# Patient Record
Sex: MALE | Race: WHITE | NOT HISPANIC OR LATINO | Employment: FULL TIME | URBAN - METROPOLITAN AREA
[De-identification: names, ages, dates, MRNs, and addresses within clinical notes are randomized per-mention and may not be internally consistent; named-entity substitution may affect disease eponyms.]

---

## 2017-01-17 DIAGNOSIS — I10 ESSENTIAL HYPERTENSION, BENIGN: ICD-10-CM

## 2017-01-17 DIAGNOSIS — Z95.2 H/O AORTIC VALVE REPLACEMENT: ICD-10-CM

## 2017-01-17 RX ORDER — AMLODIPINE AND BENAZEPRIL HYDROCHLORIDE 5; 40 MG/1; MG/1
1 CAPSULE ORAL DAILY
Qty: 90 CAP | Refills: 3 | Status: SHIPPED | OUTPATIENT
Start: 2017-01-17 | End: 2017-12-23 | Stop reason: SDUPTHER

## 2017-01-26 ENCOUNTER — APPOINTMENT (OUTPATIENT)
Dept: LAB | Facility: MEDICAL CENTER | Age: 62
End: 2017-01-26
Payer: COMMERCIAL

## 2017-01-26 ENCOUNTER — HOSPITAL ENCOUNTER (OUTPATIENT)
Dept: LAB | Facility: MEDICAL CENTER | Age: 62
End: 2017-01-26
Attending: NURSE PRACTITIONER
Payer: COMMERCIAL

## 2017-01-26 LAB
INR PPP: 2.51 (ref 0.87–1.13)
PROTHROMBIN TIME: 27.9 SEC (ref 12–14.6)

## 2017-01-26 PROCEDURE — 85610 PROTHROMBIN TIME: CPT

## 2017-01-26 PROCEDURE — 36415 COLL VENOUS BLD VENIPUNCTURE: CPT

## 2017-01-27 ENCOUNTER — ANTICOAGULATION MONITORING (OUTPATIENT)
Dept: VASCULAR LAB | Facility: MEDICAL CENTER | Age: 62
End: 2017-01-27

## 2017-01-27 DIAGNOSIS — Z95.2 H/O AORTIC VALVE REPLACEMENT: ICD-10-CM

## 2017-01-27 DIAGNOSIS — Z79.01 CHRONIC ANTICOAGULATION: ICD-10-CM

## 2017-01-27 NOTE — PROGRESS NOTES
Anticoagulation Summary as of 1/27/2017     INR goal 2.0-3.0   Selected INR 2.51 (1/26/2017)   Maintenance plan 7.5 mg (5 mg x 1.5) on Mon, Wed, Fri; 5 mg (5 mg x 1) all other days   Weekly total 42.5 mg   No change documented Mary Beth Flower, Med Ass't   Plan last modified Gianni Rodgers, PHARMD (9/12/2016)   Next INR check 2/9/2017   Target end date Indefinite    Indications   Chronic anticoagulation [Z79.01]  H/O aortic valve replacement [Z95.2]         Anticoagulation Episode Summary     INR check location     Preferred lab     Send INR reminders to     Comments       Anticoagulation Care Providers     Provider Role Specialty Phone number    Sheila Neville M.D. Referring Cardiology [phone]    CIARA Borja.P.N. Responsible Cardiology [phone]    MELVINA Patton Responsible Cardiology [phone]        Anticoagulation Patient Findings   Negatives Missed Doses, Extra Doses, Medication Changes, Antibiotic Use, Diet Changes, Dental/Other Procedures, Hospitalization, Bleeding Gums, Nose Bleeds, Blood in Urine, Blood in Stool, Any Bruising, Other Complaints        Spoke with patient to report a therapeutic INR.  Pt instructed to continue with current warfarin dosing regimen. Pt denies any s/s of bleeding, bruising, clotting or any changes to diet or medication.  Will follow up in 2 weeks.    Mary Beth Flower, Med Ass't    Agree with above plan.    MITCHEL Gonzalez

## 2017-02-05 DIAGNOSIS — I10 ESSENTIAL HYPERTENSION, BENIGN: ICD-10-CM

## 2017-02-07 RX ORDER — METOPROLOL SUCCINATE 100 MG/1
TABLET, EXTENDED RELEASE ORAL
Qty: 90 TAB | Refills: 1 | Status: SHIPPED | OUTPATIENT
Start: 2017-02-07 | End: 2018-02-09 | Stop reason: SDUPTHER

## 2017-02-10 ENCOUNTER — OFFICE VISIT (OUTPATIENT)
Dept: RHEUMATOLOGY | Facility: PHYSICIAN GROUP | Age: 62
End: 2017-02-10
Payer: COMMERCIAL

## 2017-02-10 VITALS
BODY MASS INDEX: 33.42 KG/M2 | WEIGHT: 243 LBS | RESPIRATION RATE: 14 BRPM | TEMPERATURE: 98.1 F | OXYGEN SATURATION: 95 % | HEART RATE: 73 BPM | SYSTOLIC BLOOD PRESSURE: 148 MMHG | DIASTOLIC BLOOD PRESSURE: 92 MMHG

## 2017-02-10 DIAGNOSIS — M1A.09X0 IDIOPATHIC CHRONIC GOUT OF MULTIPLE SITES WITHOUT TOPHUS: ICD-10-CM

## 2017-02-10 DIAGNOSIS — E66.01 MORBID OBESITY DUE TO EXCESS CALORIES (HCC): ICD-10-CM

## 2017-02-10 DIAGNOSIS — Z79.01 CHRONIC ANTICOAGULATION: ICD-10-CM

## 2017-02-10 DIAGNOSIS — Z95.2 H/O AORTIC VALVE REPLACEMENT: ICD-10-CM

## 2017-02-10 PROCEDURE — 99214 OFFICE O/P EST MOD 30 MIN: CPT | Performed by: INTERNAL MEDICINE

## 2017-02-10 RX ORDER — ALLOPURINOL 300 MG/1
300 TABLET ORAL
Qty: 90 TAB | Refills: 1 | Status: SHIPPED | OUTPATIENT
Start: 2017-02-10 | End: 2018-02-01 | Stop reason: SDUPTHER

## 2017-02-10 RX ORDER — COLCHICINE 0.6 MG/1
0.6 TABLET ORAL 2 TIMES DAILY
Qty: 180 TAB | Refills: 3 | Status: SHIPPED | OUTPATIENT
Start: 2017-02-10 | End: 2018-02-12

## 2017-02-10 NOTE — MR AVS SNAPSHOT
Félix Elam   2/10/2017 11:15 AM   Office Visit   MRN: [id number]    Department:  Rheumatology Med [facility]   Dept Phone:  [phone]    Description:  Male : 1955   Provider:  Kira Ruggiero M.D.           Reason for Visit     Follow-Up           Allergies as of 2/10/2017     No Known Allergies      You were diagnosed with     Idiopathic chronic gout of multiple sites without tophus   [441017]       Chronic anticoagulation   [594197]       H/O aortic valve replacement   [662678]       Morbid obesity due to excess calories (CMS-HCC)   [3259879]         Vital Signs     Blood Pressure Pulse Temperature Respirations Weight Oxygen Saturation    148/92 mmHg 73 36.7 °C (98.1 °F) 14 110.224 kg (243 lb) 95%    Smoking Status                   Never Smoker            Basic Information     Date Of Birth Sex Race Ethnicity Preferred Language    1955 Male White Non- English      Your appointments     2018  8:00 AM   Follow Up Visit with Kira Ruggiero M.D.   [facility] (--)    [address] [place] [id number] [phone]           You will be receiving a confirmation call a few days before your appointment from our automated call confirmation system.              Problem List              ICD-10-CM Priority Class Noted - Resolved    Chronic gout M1A.9XX0   2015 - Present    Chronic anticoagulation Z79.01   2015 - Present    H/O aortic valve replacement Z95.2   2015 - Present    Thrombocytopenia (CMS-HCC) D69.6   8/10/2016 - Present      Health Maintenance        Date Due Completion Dates    IMM DTaP/Tdap/Td Vaccine (1 - Tdap) 10/12/1974 ---    COLONOSCOPY 10/12/2005 ---    IMM ZOSTER VACCINE 10/12/2015 ---    IMM INFLUENZA (1) 2016 ---            Current Immunizations     No immunizations on file.      Below and/or attached are the medications your provider expects you to take. Review all of your home medications and newly  ordered medications with your provider and/or pharmacist. Follow medication instructions as directed by your provider and/or pharmacist. Please keep your medication list with you and share with your provider. Update the information when medications are discontinued, doses are changed, or new medications (including over-the-counter products) are added; and carry medication information at all times in the event of emergency situations     Allergies:  No Known Allergies          Medications  Valid as of: February 10, 2017 - 11:26 AM    Generic Name Brand Name Tablet Size Instructions for use    Allopurinol (Tab) ZYLOPRIM 300 MG Take 1 Tab by mouth every day.        Amlodipine Besy-Benazepril HCl (Cap) LOTREL 5-40 MG Take 1 Cap by mouth every day.        Aspirin (Tablet Delayed Response) ECOTRIN 81 MG Take 1 Tab by mouth every day.        Coenzyme Q10   Take  by mouth.        Colchicine (Tab) COLCRYS 0.6 MG Take 1 Tab by mouth 2 times a day.        HydroCHLOROthiazide (Tab) HYDRODIURIL 12.5 MG Take 1 Tab by mouth every day.        MD ALERT... warfarin (COUMADIN) COUMADIN  by Other route as needed.        Metoprolol Succinate (TABLET SR 24 HR) TOPROL  MG TAKE ONE TABLET BY MOUTH EVERY DAY        Rosuvastatin Calcium (Tab) CRESTOR 5 MG Take 1 Tab by mouth every evening.        Vitamins-Lipotropics (Tab) B-100 COMPLEX  Take 1 Tab by mouth every day.        Warfarin Sodium (Tab) COUMADIN 5 MG Take 1-1.5 tabs by mouth daily or as directed by coumadin clinic        .                 Medicines prescribed today were sent to:     [id number]  [place] - [address] AT [facility] [address]    [address] [place] [address]    Phone: [phone] Fax: [phone]    Open 24 Hours?: No      Medication refill instructions:       If your prescription bottle indicates you have medication refills left, it is not necessary to call your provider’s office. Please contact your pharmacy and  they will refill your medication.    If your prescription bottle indicates you do not have any refills left, you may request refills at any time through one of the following ways: The online [employer] system (except Urgent Care), by calling your provider’s office, or by asking your pharmacy to contact your provider’s office with a refill request. Medication refills are processed only during regular business hours and may not be available until the next business day. Your provider may request additional information or to have a follow-up visit with you prior to refilling your medication.   *Please Note: Medication refills are assigned a new Rx number when refilled electronically. Your pharmacy may indicate that no refills were authorized even though a new prescription for the same medication is available at the pharmacy. Please request the medicine by name with the pharmacy before contacting your provider for a refill.        Your To Do List     Future Labs/Procedures Complete By Expires    CBC WITH DIFFERENTIAL  As directed 2/10/2018    COMP METABOLIC PANEL  As directed 2/10/2018    URIC ACID  As directed 2/10/2018         [employer] Access Code: Activation code not generated  Current [employer] Status: Active

## 2017-02-10 NOTE — Clinical Note
[facility]   [address], [address]  [place] [address]  Phone: [phone]  Fax: [phone]              Encounter Date: 2/10/2017    Dear Dr. Diaz ref. provider found,    It was a pleasure seeing your patient, Félix Elam, on 2/10/2017. Diagnoses of Idiopathic chronic gout of multiple sites without tophus, Chronic anticoagulation, H/O aortic valve replacement, and Morbid obesity due to excess calories (CMS-[facility]) were pertinent to this visit.     Please find attached progress note which includes the history I obtained from Mr. Elam, my physical examination findings, my impression and recommendations.      Once again, it was a pleasure participating in your patient's care.  Please feel free to contact me if you have any questions or if I can be of any further assistance to your patients.      Sincerely,    Kira Ruggiero M.D.  Electronically Signed          PROGRESS NOTE:  No notes on file

## 2017-02-10 NOTE — PROGRESS NOTES
Chief Complaint- joint pain    Subjective:   Félix Elam is a 61 y.o. male here today for follow up of rheumatological issues    This is a follow-up visit for this patient with gout, last seen July 2016, Dx with gout 2008 in [place] with right great toe swelling, treated at the time symptomatically, with repeat of gout flare 6 months later, then gout progressed to toe, ankle and knee all on right side   Started on allopurinol 300 mg po qday since about 2010 and colchicine 0.6 mg po bid since 2010, pt tried to cut out the colchicine but had a gout  Flare and it was felt that patient should continue colchicine indefinitely  Since the last visit, patient states he's had no gout flares, patient is not exactly perfect with monitoring his diet, still has beer and shellfish occasionally,  Patient also with a history of aortic valve stenosis with resultant mechanical valve replacement, on chronic anticoagulation for such  Patient denies any chronic skin conditions, no history of psoriasis or eczema, denies any chronic anemia issues, denies any colitis, denies any diabetes mellitus, denies any thyroid problems, no iritis or uveitis in the past,  Patient continues to try to be cognizant of his walking and uses a fit bit to monitor his physical activity.    Uric acid 4.5 11/2015; Uric acid 4.1 9/2016    Current medicines (including changes today)  Current Outpatient Prescriptions   Medication Sig Dispense Refill   • allopurinol (ZYLOPRIM) 300 MG Tab Take 1 Tab by mouth every day. 90 Tab 1   • colchicine (COLCRYS) 0.6 MG Tab Take 1 Tab by mouth 2 times a day. 180 Tab 3   • metoprolol SR (TOPROL XL) 100 MG TABLET SR 24 HR TAKE ONE TABLET BY MOUTH EVERY DAY 90 Tab 1   • amlodipine-benazepril (LOTREL) 5-40 MG per capsule Take 1 Cap by mouth every day. 90 Cap 3   • rosuvastatin (CRESTOR) 5 MG Tab Take 1 Tab by mouth every evening. 90 Tab 3   • warfarin (COUMADIN) 5 MG Tab Take 1-1.5 tabs by mouth daily or as directed by  coumadin clinic 100 Tab 1   • hydrochlorothiazide (HYDRODIURIL) 12.5 MG tablet Take 1 Tab by mouth every day. 90 Tab 3   • aspirin EC (ECOTRIN) 81 MG Tablet Delayed Response Take 1 Tab by mouth every day. 90 Tab 3   • MD ALERT... warfarin (COUMADIN) by Other route as needed.     • Coenzyme Q10 (CO Q 10 PO) Take  by mouth.     • Vitamins-Lipotropics (B-100 COMPLEX) Tab Take 1 Tab by mouth every day. 90 Tab 3     No current facility-administered medications for this visit.     He  has a past medical history of Hypertension and Hyperlipidemia.    ROS   Other than what is mentioned in HPI or physical exam, there is no history of headaches, double vision or blurred vision. No temporal tenderness or jaw claudication. No history of cataracts or glaucoma. No trouble swallowing difficulties or sore throats. No history of thyroid disease. No chest complaints including chest pain, cough or sputum production. No shortness of breath. No GI complaints including nausea, vomiting, change in bowel habits, or past peptic ulcer disease. No history of blood in the stools. No urinary complaints including dysuria or frequency. No history of rash including psoriasis. No history of alopecia, photosensitivity, oral ulcerations, Raynaud's phenomena, or swollen joints. No history of gout. No back complaints. No history of low blood counts.       Objective:     Blood pressure 148/92, pulse 73, temperature 36.7 °C (98.1 °F), resp. rate 14, weight 110.224 kg (243 lb), SpO2 95 %. Body mass index is 33.42 kg/(m^2).   Physical Exam:  Constitutional: Alert and oriented X3, no distress.Skin: Warm, dry, good turgor, no rashes in visible areas Did not appreciate any psoriatic plaques or discoid lesionsEye: Equal, round and reactive, conjunctiva clear, lids normal EOM intactENMT: Lips without lesions, good dentition, no oropharyngeal ulcers, moist buccal mucosa, pinna without deformityNeck: Trachea midline, no masses, no thyromegaly.Lymph: no cervical  lymphadenopathy, no axillary lymphadenopathy, no supraclavicular lymphadenopathyRespiratory: Unlabored respiratory effort, lungs clear to auscultation, no wheezes, no ronchi.Cardiovascular: Normal S1, S2,There is a loud click compatible with patient's artificial aortic valve, also with a 3/6 systolic ejection murmur heard at the right sternal border, no edema.Abdomen: Soft, non-tender, no masses, no hepatosplenomegaly positive central obesityPsych: Alert and oriented x3, normal affect and mood.Neuro: Cranial nerves 2-12 are grossly intact, no loss of sensation LEExt:no joint laxity noted in bilateral arms, no joint laxity noted in bilateral legs, no nodule, no tophi, no ulnar deviation, no flexure contractures, no swan-neck or but deformities, no sausage digits, evidence of Heberden nodes, no tophi, no nodules, there is about 2+ pitting edema bilateral lower extremities, patient just recently got off an airplane after a long trip, patient states the swelling in legs is pretty typical of his long trips.  Lab Results   Component Value Date/Time    HEPATITIS C ANTIBODY Negative 09/19/2016 02:25 PM     Lab Results   Component Value Date/Time    SODIUM 145 09/19/2016 02:25 PM    POTASSIUM 3.6 09/19/2016 02:25 PM    CHLORIDE 108 09/19/2016 02:25 PM    CO2 27 09/19/2016 02:25 PM    GLUCOSE 93 09/19/2016 02:25 PM    BUN 25* 09/19/2016 02:25 PM    CREATININE 0.74 09/19/2016 02:25 PM      Lab Results   Component Value Date/Time    WBC 3.8* 08/10/2016 09:01 AM    RBC 4.80 08/10/2016 09:01 AM    HEMOGLOBIN 14.9 08/10/2016 09:01 AM    HEMATOCRIT 45.2 08/10/2016 09:01 AM    MCV 94.2 08/10/2016 09:01 AM    MCH 31.0 08/10/2016 09:01 AM    MCHC 33.0* 08/10/2016 09:01 AM    MPV 12.8 08/10/2016 09:01 AM    NEUTROPHILS-POLYS 59.90 08/10/2016 09:01 AM    LYMPHOCYTES 28.20 08/10/2016 09:01 AM    MONOCYTES 8.40 08/10/2016 09:01 AM    EOSINOPHILS 2.40 08/10/2016 09:01 AM    BASOPHILS 1.10 08/10/2016 09:01 AM      Lab Results   Component  Value Date/Time    CALCIUM 9.2 09/19/2016 02:25 PM    AST(SGOT) 29 09/19/2016 02:25 PM    ALT(SGPT) 26 09/19/2016 02:25 PM    ALKALINE PHOSPHATASE 51 09/19/2016 02:25 PM    TOTAL BILIRUBIN 1.0 09/19/2016 02:25 PM    ALBUMIN 4.7 09/19/2016 02:25 PM    TOTAL PROTEIN 6.6 09/19/2016 02:25 PM     Lab Results   Component Value Date/Time    URIC ACID 4.1 09/19/2016 02:25 PM    ANTINUCLEAR ANTIBODY None Detected 09/19/2016 02:25 PM       Assessment and Plan:     1. Idiopathic chronic gout of multiple sites without tophus  Continue allopurinol 300 mg by mouth daily and colchicine 0.6 mg by mouth twice a day, today we'll recheck a uric acid level as well as blood count and liver functions and kidney functions  - URIC ACID; Future  - CBC WITH DIFFERENTIAL; Future  - COMP METABOLIC PANEL; Future  - allopurinol (ZYLOPRIM) 300 MG Tab; Take 1 Tab by mouth every day.  Dispense: 90 Tab; Refill: 1  - colchicine (COLCRYS) 0.6 MG Tab; Take 1 Tab by mouth 2 times a day.  Dispense: 180 Tab; Refill: 3    2. Chronic anticoagulation  Because of aortic valve replacement  This will impact with kind of medications we can use for this patient's arthritis, NSAIDs are contraindicated because of increased risk of bleeding while on chronic anticoagulation  - URIC ACID; Future  - CBC WITH DIFFERENTIAL; Future  - COMP METABOLIC PANEL; Future  - allopurinol (ZYLOPRIM) 300 MG Tab; Take 1 Tab by mouth every day.  Dispense: 90 Tab; Refill: 1  - colchicine (COLCRYS) 0.6 MG Tab; Take 1 Tab by mouth 2 times a day.  Dispense: 180 Tab; Refill: 3    3. H/O aortic valve replacement  On chronic anticoagulation for such  This will impact with kind of medications we can use for this patient's arthritis, NSAIDs are contraindicated because of increased risk of bleeding while on chronic anticoagulation  - URIC ACID; Future  - CBC WITH DIFFERENTIAL; Future  - COMP METABOLIC PANEL; Future  - allopurinol (ZYLOPRIM) 300 MG Tab; Take 1 Tab by mouth every day.  Dispense:  90 Tab; Refill: 1  - colchicine (COLCRYS) 0.6 MG Tab; Take 1 Tab by mouth 2 times a day.  Dispense: 180 Tab; Refill: 3    4. Morbid obesity due to excess calories (CMS-HCC)  Exacerbates the patient's arthritis, recommend to lose weight, patient states understanding  - URIC ACID; Future  - CBC WITH DIFFERENTIAL; Future  - COMP METABOLIC PANEL; Future  - allopurinol (ZYLOPRIM) 300 MG Tab; Take 1 Tab by mouth every day.  Dispense: 90 Tab; Refill: 1  - colchicine (COLCRYS) 0.6 MG Tab; Take 1 Tab by mouth 2 times a day.  Dispense: 180 Tab; Refill: 3    Followup: Return in about 1 year (around 2/10/2018). or sooner prn    Patient was seen 30 minutes face-to-face of which more than 50% of the time was spent counseling the patient (excluding time for procedures)  regarding  rheumatological condition and care. Therapy was discussed in detail.    Please note that this dictation was created using voice recognition software. I have made every reasonable attempt to correct obvious errors, but I expect that there are errors of grammar and possibly content that I did not discover before finalizing the note.

## 2017-02-16 DIAGNOSIS — Z95.2 HEART VALVE REPLACED: ICD-10-CM

## 2017-03-01 DIAGNOSIS — Z95.2 H/O AORTIC VALVE REPLACEMENT: ICD-10-CM

## 2017-03-02 RX ORDER — ASPIRIN 81 MG
TABLET, DELAYED RELEASE (ENTERIC COATED) ORAL
Qty: 90 TAB | Refills: 2 | Status: SHIPPED | OUTPATIENT
Start: 2017-03-02

## 2017-03-06 ENCOUNTER — HOSPITAL ENCOUNTER (OUTPATIENT)
Facility: MEDICAL CENTER | Age: 62
End: 2017-03-06
Attending: INTERNAL MEDICINE
Payer: COMMERCIAL

## 2017-03-06 LAB
ALBUMIN SERPL BCP-MCNC: 4.3 G/DL (ref 3.2–4.9)
ALBUMIN/GLOB SERPL: 2 G/DL
ALP SERPL-CCNC: 41 U/L (ref 30–99)
ALT SERPL-CCNC: 39 U/L (ref 2–50)
ANION GAP SERPL CALC-SCNC: 10 MMOL/L (ref 0–11.9)
AST SERPL-CCNC: 38 U/L (ref 12–45)
BILIRUB SERPL-MCNC: 0.9 MG/DL (ref 0.1–1.5)
BUN SERPL-MCNC: 16 MG/DL (ref 8–22)
CALCIUM SERPL-MCNC: 9.2 MG/DL (ref 8.5–10.5)
CHLORIDE SERPL-SCNC: 104 MMOL/L (ref 96–112)
CO2 SERPL-SCNC: 25 MMOL/L (ref 20–33)
CREAT SERPL-MCNC: 0.77 MG/DL (ref 0.5–1.4)
GLOBULIN SER CALC-MCNC: 2.2 G/DL (ref 1.9–3.5)
GLUCOSE SERPL-MCNC: 143 MG/DL (ref 65–99)
POTASSIUM SERPL-SCNC: 3.2 MMOL/L (ref 3.6–5.5)
PROT SERPL-MCNC: 6.5 G/DL (ref 6–8.2)
SODIUM SERPL-SCNC: 139 MMOL/L (ref 135–145)

## 2017-03-06 PROCEDURE — 80053 COMPREHEN METABOLIC PANEL: CPT

## 2017-03-14 ENCOUNTER — HOSPITAL ENCOUNTER (OUTPATIENT)
Dept: LAB | Facility: MEDICAL CENTER | Age: 62
End: 2017-03-14
Attending: NURSE PRACTITIONER
Payer: COMMERCIAL

## 2017-03-14 LAB
INR PPP: 2.39 (ref 0.87–1.13)
PROTHROMBIN TIME: 26.8 SEC (ref 12–14.6)

## 2017-03-14 PROCEDURE — 85610 PROTHROMBIN TIME: CPT

## 2017-03-14 PROCEDURE — 36415 COLL VENOUS BLD VENIPUNCTURE: CPT

## 2017-03-15 ENCOUNTER — ANTICOAGULATION MONITORING (OUTPATIENT)
Dept: VASCULAR LAB | Facility: MEDICAL CENTER | Age: 62
End: 2017-03-15

## 2017-03-15 DIAGNOSIS — Z95.2 H/O AORTIC VALVE REPLACEMENT: ICD-10-CM

## 2017-03-15 DIAGNOSIS — Z79.01 CHRONIC ANTICOAGULATION: ICD-10-CM

## 2017-03-15 NOTE — PROGRESS NOTES
OP Anticoagulation Telephone Note    Date: 3/15/2017  Anticoagulation Summary as of 3/15/2017     INR goal 2.0-3.0   Selected INR 2.39 (3/14/2017)   Maintenance plan 7.5 mg (5 mg x 1.5) on Mon, Wed, Fri; 5 mg (5 mg x 1) all other days   Weekly total 42.5 mg   No change documented Velma Friend, Med Ass't   Plan last modified Gianni Rodgers, PHARMD (9/12/2016)   Next INR check 4/11/2017   Target end date Indefinite    Indications   Chronic anticoagulation [Z79.01]  H/O aortic valve replacement [Z95.2]         Anticoagulation Episode Summary     INR check location     Preferred lab     Send INR reminders to     Comments       Anticoagulation Care Providers     Provider Role Specialty Phone number    Sheila Neville M.D. Referring Cardiology [phone]    MELVINA Borja Responsible Cardiology [phone]    MELVINA Patton Responsible Cardiology [phone]        Anticoagulation Patient Findings   Negatives Missed Doses, Extra Doses, Medication Changes, Antibiotic Use, Diet Changes, Dental/Other Procedures, Hospitalization, Bleeding Gums, Nose Bleeds, Blood in Urine, Blood in Stool, Any Bruising, Other Complaints      Plan:  Spoke with patient on the phone. Patient is therapeutic today. Patient denies any changes in medications or diet. Patient denies any signs or symptoms of bleeding or clotting. Instructed patient to call clinic if any unusual bleeding or bruising occurs. Will continue dosing as outlined above. Will follow-up with patient in 4 weeks.    Velma Friend, Medical Assistant    Agree with plan of care as stated above.    Claire GRULLON

## 2017-04-17 ENCOUNTER — HOSPITAL ENCOUNTER (OUTPATIENT)
Dept: LAB | Facility: MEDICAL CENTER | Age: 62
End: 2017-04-17
Attending: NURSE PRACTITIONER
Payer: COMMERCIAL

## 2017-04-17 DIAGNOSIS — Z95.2 H/O AORTIC VALVE REPLACEMENT: ICD-10-CM

## 2017-04-17 DIAGNOSIS — Z79.01 CHRONIC ANTICOAGULATION: ICD-10-CM

## 2017-04-17 LAB
INR PPP: 1.84 (ref 0.87–1.13)
PROTHROMBIN TIME: 21.8 SEC (ref 12–14.6)

## 2017-04-17 PROCEDURE — 36415 COLL VENOUS BLD VENIPUNCTURE: CPT

## 2017-04-17 PROCEDURE — 85610 PROTHROMBIN TIME: CPT

## 2017-04-18 ENCOUNTER — ANTICOAGULATION MONITORING (OUTPATIENT)
Dept: VASCULAR LAB | Facility: MEDICAL CENTER | Age: 62
End: 2017-04-18

## 2017-04-18 DIAGNOSIS — Z95.2 H/O AORTIC VALVE REPLACEMENT: ICD-10-CM

## 2017-04-18 DIAGNOSIS — Z79.01 CHRONIC ANTICOAGULATION: ICD-10-CM

## 2017-04-18 NOTE — PROGRESS NOTES
Anticoagulation Summary as of 4/18/2017     INR goal 2.0-3.0   Selected INR 1.84! (4/17/2017)   Maintenance plan 7.5 mg (5 mg x 1.5) on Mon, Wed, Fri; 5 mg (5 mg x 1) all other days   Weekly total 42.5 mg   Plan last modified Gianni Rdogers, PHARMD (9/12/2016)   Next INR check 5/16/2017   Target end date Indefinite    Indications   Chronic anticoagulation [Z79.01]  H/O aortic valve replacement [Z95.2]         Anticoagulation Episode Summary     INR check location     Preferred lab     Send INR reminders to     Comments       Anticoagulation Care Providers     Provider Role Specialty Phone number    Sheila Neville M.D. Referring Cardiology [phone]    MACHELLE Borja. Responsible Cardiology [phone]    MELVINA Patton Responsible Cardiology [phone]        Anticoagulation Patient Findings    Spoke with pt on the phone. Pt is subtherapeutic today. Denies any changes in medications or diet. Patient denies any s/sx of bleeding or clotting. He states he has been taking 7.5mg two times per week-- which was lower than previously documented.  Will increase dosing  (7.5mg 3 x per week) as outlined in calendar. Will follow-up with pt in 1 month per pt request.    Claire GRULLON

## 2017-04-28 DIAGNOSIS — Z95.2 H/O AORTIC VALVE REPLACEMENT: ICD-10-CM

## 2017-04-28 RX ORDER — WARFARIN SODIUM 5 MG/1
TABLET ORAL
Qty: 135 TAB | Refills: 1 | Status: SHIPPED | OUTPATIENT
Start: 2017-04-28 | End: 2017-10-25 | Stop reason: SDUPTHER

## 2017-05-25 ENCOUNTER — HOSPITAL ENCOUNTER (OUTPATIENT)
Dept: LAB | Facility: MEDICAL CENTER | Age: 62
End: 2017-05-25
Attending: NURSE PRACTITIONER
Payer: COMMERCIAL

## 2017-05-25 LAB
INR PPP: 2.7 (ref 0.87–1.13)
PROTHROMBIN TIME: 29.5 SEC (ref 12–14.6)

## 2017-05-25 PROCEDURE — 36415 COLL VENOUS BLD VENIPUNCTURE: CPT

## 2017-05-25 PROCEDURE — 85610 PROTHROMBIN TIME: CPT

## 2017-05-26 ENCOUNTER — ANTICOAGULATION MONITORING (OUTPATIENT)
Dept: VASCULAR LAB | Facility: MEDICAL CENTER | Age: 62
End: 2017-05-26

## 2017-05-26 DIAGNOSIS — Z95.2 H/O AORTIC VALVE REPLACEMENT: ICD-10-CM

## 2017-05-26 DIAGNOSIS — Z79.01 CHRONIC ANTICOAGULATION: ICD-10-CM

## 2017-05-26 NOTE — PROGRESS NOTES
Anticoagulation Summary as of 5/26/2017     INR goal 2.0-3.0   Selected INR 2.70 (5/25/2017)   Maintenance plan 7.5 mg (5 mg x 1.5) on Mon, Wed, Fri; 5 mg (5 mg x 1) all other days   Weekly total 42.5 mg   No change documented Mary Beth Flower, Med Ass't   Plan last modified Gianni Rodgers, PHARMD (9/12/2016)   Next INR check 6/25/2017   Target end date Indefinite    Indications   Chronic anticoagulation [Z79.01]  H/O aortic valve replacement [Z95.2]         Anticoagulation Episode Summary     INR check location     Preferred lab     Send INR reminders to     Comments       Anticoagulation Care Providers     Provider Role Specialty Phone number    Sheila Neville M.D. Referring Cardiology [phone]    ERIKA BorjaP.N. Responsible Cardiology [phone]    ERIKA PattonPASHLEE Responsible Cardiology [phone]        Anticoagulation Patient Findings   Negatives Missed Doses, Extra Doses, Medication Changes, Antibiotic Use, Diet Changes, Dental/Other Procedures, Hospitalization, Bleeding Gums, Nose Bleeds, Blood in Urine, Blood in Stool, Any Bruising, Other Complaints        Spoke with patient to report a therapeutic INR.  Pt instructed to continue with current warfarin dosing regimen. Pt denies any s/s of bleeding, bruising, clotting or any changes to diet or medication.  Will follow up in 4 weeks.    Mary Beth Flower Med Ass't    Agree with plan of care as stated above.    Claire GRULLON

## 2017-06-07 ENCOUNTER — OFFICE VISIT (OUTPATIENT)
Dept: MEDICAL GROUP | Facility: PHYSICIAN GROUP | Age: 62
End: 2017-06-07
Payer: COMMERCIAL

## 2017-06-07 VITALS
DIASTOLIC BLOOD PRESSURE: 90 MMHG | WEIGHT: 241.62 LBS | TEMPERATURE: 98.6 F | BODY MASS INDEX: 32.73 KG/M2 | SYSTOLIC BLOOD PRESSURE: 140 MMHG | HEIGHT: 72 IN | RESPIRATION RATE: 16 BRPM | HEART RATE: 68 BPM | OXYGEN SATURATION: 94 %

## 2017-06-07 DIAGNOSIS — M1A.09X0 IDIOPATHIC CHRONIC GOUT OF MULTIPLE SITES WITHOUT TOPHUS: ICD-10-CM

## 2017-06-07 DIAGNOSIS — R04.0 FREQUENT EPISTAXIS: ICD-10-CM

## 2017-06-07 DIAGNOSIS — Z23 NEED FOR VACCINATION: ICD-10-CM

## 2017-06-07 DIAGNOSIS — E78.00 PURE HYPERCHOLESTEROLEMIA: ICD-10-CM

## 2017-06-07 DIAGNOSIS — E66.9 OBESITY (BMI 30-39.9): ICD-10-CM

## 2017-06-07 DIAGNOSIS — Z91.89: ICD-10-CM

## 2017-06-07 DIAGNOSIS — I10 ESSENTIAL HYPERTENSION: ICD-10-CM

## 2017-06-07 DIAGNOSIS — Z79.01 CHRONIC ANTICOAGULATION: ICD-10-CM

## 2017-06-07 DIAGNOSIS — R20.2 TINGLING IN EXTREMITIES: ICD-10-CM

## 2017-06-07 DIAGNOSIS — Z95.2 H/O AORTIC VALVE REPLACEMENT: ICD-10-CM

## 2017-06-07 DIAGNOSIS — Z80.8 FAMILY HISTORY OF MELANOMA: ICD-10-CM

## 2017-06-07 PROCEDURE — 90736 HZV VACCINE LIVE SUBQ: CPT | Performed by: FAMILY MEDICINE

## 2017-06-07 PROCEDURE — 90471 IMMUNIZATION ADMIN: CPT | Performed by: FAMILY MEDICINE

## 2017-06-07 PROCEDURE — 99204 OFFICE O/P NEW MOD 45 MIN: CPT | Mod: 25 | Performed by: FAMILY MEDICINE

## 2017-06-07 RX ORDER — LEVOFLOXACIN 500 MG/1
500 TABLET, FILM COATED ORAL DAILY
Qty: 10 TAB | Refills: 3 | Status: SHIPPED | OUTPATIENT
Start: 2017-06-07 | End: 2018-04-02 | Stop reason: SDUPTHER

## 2017-06-07 ASSESSMENT — PATIENT HEALTH QUESTIONNAIRE - PHQ9: CLINICAL INTERPRETATION OF PHQ2 SCORE: 0

## 2017-06-07 NOTE — Clinical Note
Hi Félix Hernández established with me today. Such a cool person and also from [place]!  I was wondering if he would need to be on a statin lifelong. He has been having some tingling in his feet and thinks it is linked to the Crestor. His symptoms are rather mild, so I think he can tolerate it, but just to know for the future.  Thanks! -Sara

## 2017-06-07 NOTE — Clinical Note
[facility]  Kusum Wesley M.D.  [address]  [place] [address]  Fax: [phone]   Authorization for Release/Disclosure of   Protected Health Information   Name: FÉLIX LOVELACE : 1955 SSN: [id number]   Address: [address] [place] [address]  [place] [address] Phone:    [phone] (home)    I authorize the entity listed below to release/disclose the PHI below to:   [facility]/Kusum Wesley M.D. and Kusum Wesley M.D.   Provider or Entity Name:  [facility]    Address   City, State, [place], [place]  Phone:      Fax:     Reason for request: continuity of care   Information to be released:    [  ] LAST COLONOSCOPY,  including any PATH REPORT and follow-up  [  ] LAST FIT/COLOGUARD RESULT [  ] LAST DEXA  [  ] LAST MAMMOGRAM  [  ] LAST PAP  [  ] LAST LABS [  ] RETINA EXAM REPORT  [  ] IMMUNIZATION RECORDS  [  ] Release all info      [  ] Check here and initial the line next to each item to release ALL health information INCLUDING  _____ Care and treatment for drug and / or alcohol abuse  _____ HIV testing, infection status, or AIDS  _____ Genetic Testing    DATES OF SERVICE OR TIME PERIOD TO BE DISCLOSED: _____________  I understand and acknowledge that:  * This Authorization may be revoked at any time by you in writing, except if your health information has already been used or disclosed.  * Your health information that will be used or disclosed as a result of you signing this authorization could be re-disclosed by the recipient. If this occurs, your re-disclosed health information may no longer be protected by State or Federal laws.  * You may refuse to sign this Authorization. Your refusal will not affect your ability to obtain treatment.  * This Authorization becomes effective upon signing and will  on (date) __________.      If no date is indicated, this Authorization will  one (1) year from the signature date.    Name: Félix Lovelace    Signature:   Date:          6/7/2017       PLEASE FAX REQUESTED RECORDS BACK TO: [phone]

## 2017-06-07 NOTE — ASSESSMENT & PLAN NOTE
Patient had an aortic valve replacement in 2010. At the time, his aortic root was enlarged and he was in heart failure. It is mechanical. He will need to be on lifelong anticoagulation and is currently following with the Coumadin clinic. Following with Dr. Sheila Neville.    Has INR checked every 4 weeks. He mentions that almost all of his male family members have had issues with their heart.

## 2017-06-07 NOTE — PROGRESS NOTES
Félix Elam is a 61 y.o. male here to establish care and discuss history of aortic valve replacement and tingling in feet.    HPI:  Félix is a charming 61-year-old male here to establish care. His previous PCP was Dr. Cordoba at [facility]. 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 He and his wife were living in [place] until a couple of years ago.     H/O aortic valve replacement  Patient had an aortic valve replacement in 2010. At the time, his aortic root was enlarged and he was in heart failure. It is mechanical. He will need to be on lifelong anticoagulation and is currently following with the Coumadin clinic. Following with Dr. Sheila Neville. Has INR checked every 4 weeks. He mentions that almost all of his male family members have had issues with their heart.    Chronic gout  This is a chronic issue for the patient. He is on allopurinol 300 mg daily and colchicine 0.6 mg 1.5 tabs daily. He was unable to tolerate being off colchicine due to recurrent flares. Follows with Dr. Ruggiero.    Essential hypertension  Stable. Monitoring BP at home. Currently taking amlodipine-benazepril 5-40mg daily, HCTZ 12.5mg daily and metoprolol XL 100mg daily as directed. Also taking baby aspirin. Denies lightheadedness, vision changes, headache, palpitations or leg swelling.    Tingling in extremities  Patient has had several years of tingling in his bilateral feet. He believes it is from Crestor as it started around the time of his aortic valve replacement and stopped when he discontinued the medication for a short period of time. He has tried a couple of the other statin medications and has had the same tingling in his feet.    It mostly occurs in the mornings and is located and a few patches on the top of his feet. When I ask him on a scale of 1-10, which it bothers him, he tells me it is 3. We discussed medication to help with the tingling, but he is not interested at this  time. He is not sure if he needs to be on the statin medication for the rest of his life. He had a lipid panel done at [facility] last year. Unfortunately, the results are not available for me to review today.    Frequent epistaxis  Has had multiple nosebleeds recently. He is on coumadin for a mechanical aortic valve. The nosebleeds have improved with using a humidifier and saline nasal spray. He has not needed to go to the ER.    Family history of melanoma  Patient's mother and brother have been diagnosed with melanoma in the past. He does not have any concerning skin moles at this time, but would like a referral to a dermatologist for regular skin exams.    Current medicines (including changes today)  Current Outpatient Prescriptions   Medication Sig Dispense Refill   • levofloxacin (LEVAQUIN) 500 MG tablet Take 1 Tab by mouth every day for 10 days. For respiratory infection. 10 Tab 3   • warfarin (COUMADIN) 5 MG Tab TAKE 1 TO 1 AND 1/2 TABLETS BY MOUTH DAILY AS DIRECTED BY COUMADIN CLINIC 135 Tab 1   • ASPIRIN LOW DOSE 81 MG EC tablet TAKE 1 TABLET BY MOUTH DAILY 90 Tab 2   • allopurinol (ZYLOPRIM) 300 MG Tab Take 1 Tab by mouth every day. 90 Tab 1   • colchicine (COLCRYS) 0.6 MG Tab Take 1 Tab by mouth 2 times a day. (Patient taking differently: Take 0.6 mg by mouth every day. 1 1/2 tab QD) 180 Tab 3   • metoprolol SR (TOPROL XL) 100 MG TABLET SR 24 HR TAKE ONE TABLET BY MOUTH EVERY DAY 90 Tab 1   • amlodipine-benazepril (LOTREL) 5-40 MG per capsule Take 1 Cap by mouth every day. 90 Cap 3   • rosuvastatin (CRESTOR) 5 MG Tab Take 1 Tab by mouth every evening. 90 Tab 3   • hydrochlorothiazide (HYDRODIURIL) 12.5 MG tablet Take 1 Tab by mouth every day. 90 Tab 3   • MD ALERT... warfarin (COUMADIN) by Other route as needed.       No current facility-administered medications for this visit.     He  has a past medical history of Hypertension; Hyperlipidemia; and Gout.  He  has past surgical history that includes aortic  "valve replacement.  Social History   Substance Use Topics   • Smoking status: Never Smoker    • Smokeless tobacco: Never Used   • Alcohol Use: 1.2 oz/week     2 Glasses of wine per week     Social History     Social History Narrative     Family History   Problem Relation Age of Onset   • Heart Disease Father    • Heart Disease Brother    • Cancer Brother      Melanoma   • Other Mother      Gout   • Cancer Mother      Melanoma and SCC   • Other Brother      Parkinson's Disease   • No Known Problems Daughter    • Heart Disease Son      Pulmonary hypertension     Family Status   Relation Status Death Age   • Father     • Brother Alive    • Mother Alive    • Brother     • Brother Alive    • Daughter Alive    • Son       ROS  Constitutional: Negative for fever, chills and malaise/fatigue.   HENT: Negative for congestion.    Eyes: Negative for pain.   Respiratory: Negative for cough and shortness of breath.    Cardiovascular: Negative for leg swelling.   Gastrointestinal: Negative for nausea, vomiting, abdominal pain and diarrhea.   Genitourinary: Negative for dysuria and hematuria.   Skin: Negative for rash.   Neurological: See HPI. Negative for dizziness, focal weakness and headaches.   Endo/Heme/Allergies: Does not bruise/bleed easily.   Psychiatric/Behavioral: Negative for depression.  The patient is not nervous/anxious.       Objective:     Physical Exam:  Blood pressure 140/90, pulse 68, temperature 37 °C (98.6 °F), resp. rate 16, height 1.829 m (6' 0.01\"), weight 109.6 kg (241 lb 10 oz), SpO2 94 %. Body mass index is 32.76 kg/(m^2).  Constitutional: Alert, healthy-appearing pleasant male in no distress.  Skin: Warm, dry, good turgor, no rashes in visible areas.  Eye: Equal, round and reactive, conjunctiva clear, lids normal.  ENMT: TM's clear bilaterally, lips without lesions, good dentition, oropharynx clear.  Neck: Trachea midline, no masses, no thyromegaly. No cervical or supraclavicular " lymphadenopathy.  Respiratory: Unlabored respiratory effort, lungs clear to auscultation, no wheezes, no ronchi.  Cardiovascular: Healed surgical scar. Regular rate. +Mechanical click without significant murmur, no edema.  Abdomen: Soft, non-tender, no masses, no hepatosplenomegaly.  Neuro: DTRs 2+ and symmetric. No cranial nerve deficit. Strength and sensation intact. Negative Rhomberg. No dysdiadochokinesia.   Psych: Alert and oriented x3, normal affect and mood.    Assessment and Plan:     1. Tingling in extremities  Chronic and stable. Fortunately, his symptoms are rather mild and are not affecting his quality of life. There does appear to be a relation to statin use. Lipid panel from 2015 shows a decreased HDL. I will ask his cardiologist if he will need to be on a statin medication lifelong for his history of aortic valve replacement. It would be helpful to know if his symptoms worsen in the future.    2. H/O aortic valve replacement  3. Chronic anticoagulation  Reviewed history. Chronic and stable. On lifelong anticoagulation. Following with cardiology.  - levofloxacin (LEVAQUIN) 500 MG tablet; Take 1 Tab by mouth every day for 10 days. For respiratory infection.  Dispense: 10 Tab; Refill: 3    4. Idiopathic chronic gout of multiple sites without tophus  Chronic and stable. Continue allopurinol and colchicine. Following with rheumatology.    5. Essential hypertension  Well-controlled. Labs as indicated. Continue antihypertensive medications. Discussed decreasing salt intake. Emphasized benefits of exercise and diet. Continue to monitor.    6. Frequent epistaxis  Advised humidifier and Vaseline. Will continue to monitor.    7. Family history of melanoma  There is a strong family history of melanoma in patient's mother and brother. He does not have any concerning moles today. Referral to dermatology per his request. I did advise him that it may take a few months for him to be seen and to call our office in the  meantime if he has any concerns.  - REFERRAL TO DERMATOLOGY    8. At risk for respiratory infection  Patient travels frequently for work. He would like to have a prescription for antibiotics in case of respiratory infection. He has tolerated levofloxacin in the past without significant changes in his INR.  - levofloxacin (LEVAQUIN) 500 MG tablet; Take 1 Tab by mouth every day for 10 days. For respiratory infection.  Dispense: 10 Tab; Refill: 3    9. Obesity (BMI 30-39.9)  Patient's weight was discussed today, including healthy low-carb diet, 30-minutes of moderate exercise daily and avoiding sugars and high-fat foods.  - Patient identified as having weight management issue.  Appropriate orders and counseling given.    10. Need for vaccination  Zostavax discussed and administered in office today.  - VARICELLA ZOSTER VACCINE SQ    Records requested from [facility].  Followup: Return in about 6 months (around 12/7/2017) for routine follow-up, long.         PLEASE NOTE: This dictation was created using voice recognition software. I have made every reasonable attempt to correct obvious errors, but I expect that there are errors of grammar and possibly content that I did not discover before finalizing the note.

## 2017-06-07 NOTE — MR AVS SNAPSHOT
"        Félix Mcmillan Hazard   2017 1:20 PM   Office Visit   MRN: [id number]    [facility]   Dept Phone:  [phone]    Description:  Male : 1955   Provider:  Kusum Wesley M.D.           Reason for Visit     Tingling toes & feet, daily     Referral Needed Dermatology - skin check     Immunizations Zostavax       Allergies as of 2017     No Known Allergies      You were diagnosed with     Tingling in extremities   [311391]       H/O aortic valve replacement   [180176]       Chronic anticoagulation   [133090]       Idiopathic chronic gout of multiple sites without tophus   [837866]       Essential hypertension   [1458379]       Pure hypercholesterolemia   [272.0.ICD-9-CM]       Obesity (BMI 30-39.9)   [768665]       Need for vaccination   [633904]       Frequent epistaxis   [4438454]       At risk for respiratory infection   [7252583]       Family history of melanoma   [518897]         Vital Signs     Blood Pressure Pulse Temperature Respirations Height Weight    140/90 mmHg 68 37 °C (98.6 °F) 16 1.829 m (6' 0.01\") 109.6 kg (241 lb 10 oz)    Body Mass Index Oxygen Saturation Smoking Status             32.76 kg/m2 94% Never Smoker          Basic Information     Date Of Birth Sex Race Ethnicity Preferred Language    1955 Male White Non- English      Your appointments     2017  3:30 PM   Adult Draw/Collection with [facility]   [facility] (--)    [address] [address]   [phone]            Sep 21, 2017  9:00 AM   FOLLOW UP with Sheila Neville M.D.   [facility] (--[facility]    [address] [facility] [address]  [place] [id number] [phone]            Dec 18, 2017  7:40 AM   Established Patient with [facility] (--)    [address]  [address]  [place] [id number] [phone]           You will be receiving a confirmation call a few days before your appointment from our automated call " confirmation system.            Feb 12, 2018  8:00 AM   Follow Up Visit with Kira Ruggiero M.D.   [facility] (--)    [address], [address]  [facility] [address] [phone]           You will be receiving a confirmation call a few days before your appointment from our automated call confirmation system.              Problem List              ICD-10-CM Priority Class Noted - Resolved    Chronic gout M1A.9XX0   11/9/2015 - Present    Chronic anticoagulation Z79.01   11/9/2015 - Present    H/O aortic valve replacement Z95.2   11/9/2015 - Present    Thrombocytopenia (CMS-HCC) D69.6   8/10/2016 - Present    Tingling in extremities R20.2   6/7/2017 - Present    Essential hypertension I10   6/7/2017 - Present    Pure hypercholesterolemia E78.00   6/7/2017 - Present    Obesity (BMI 30-39.9) E66.9   6/7/2017 - Present    Frequent epistaxis R04.0   6/7/2017 - Present    Family history of melanoma Z80.8   6/7/2017 - Present      Health Maintenance        Date Due Completion Dates    IMM DTaP/Tdap/Td Vaccine (1 - Tdap) 10/12/1974 ---    COLONOSCOPY 10/12/2005 ---    IMM ZOSTER VACCINE 10/12/2015 ---            Current Immunizations     SHINGLES VACCINE  Incomplete      Below and/or attached are the medications your provider expects you to take. Review all of your home medications and newly ordered medications with your provider and/or pharmacist. Follow medication instructions as directed by your provider and/or pharmacist. Please keep your medication list with you and share with your provider. Update the information when medications are discontinued, doses are changed, or new medications (including over-the-counter products) are added; and carry medication information at all times in the event of emergency situations     Allergies:  No Known Allergies          Medications  Valid as of: June 07, 2017 -  2:15 PM    Generic Name Brand Name Tablet Size Instructions for use    Allopurinol (Tab) ZYLOPRIM  300 MG Take 1 Tab by mouth every day.        Amlodipine Besy-Benazepril HCl (Cap) LOTREL 5-40 MG Take 1 Cap by mouth every day.        Aspirin (Tablet Delayed Response) ASPIRIN LOW DOSE 81 MG TAKE 1 TABLET BY MOUTH DAILY        Colchicine (Tab) COLCRYS 0.6 MG Take 1 Tab by mouth 2 times a day.        HydroCHLOROthiazide (Tab) HYDRODIURIL 12.5 MG Take 1 Tab by mouth every day.        LevoFLOXacin (Tab) LEVAQUIN 500 MG Take 1 Tab by mouth every day for 10 days. For respiratory infection.        MD ALERT... warfarin (COUMADIN) COUMADIN  by Other route as needed.        Metoprolol Succinate (TABLET SR 24 HR) TOPROL  MG TAKE ONE TABLET BY MOUTH EVERY DAY        Rosuvastatin Calcium (Tab) CRESTOR 5 MG Take 1 Tab by mouth every evening.        Warfarin Sodium (Tab) COUMADIN 5 MG TAKE 1 TO 1 AND 1/2 TABLETS BY MOUTH DAILY AS DIRECTED BY COUMADIN CLINIC        .                 Medicines prescribed today were sent to:     [facility] [id number] [place] [address]  [place] [facility]    [address] [place] [facility] [address]    Phone: [phone] Fax: [phone]    Open 24 Hours?: No      Medication refill instructions:       If your prescription bottle indicates you have medication refills left, it is not necessary to call your provider’s office. Please contact your pharmacy and they will refill your medication.    If your prescription bottle indicates you do not have any refills left, you may request refills at any time through one of the following ways: The online [employer] system (except Urgent Care), by calling your provider’s office, or by asking your pharmacy to contact your provider’s office with a refill request. Medication refills are processed only during regular business hours and may not be available until the next business day. Your provider may request additional information or to have a follow-up visit with you prior to refilling your medication.   *Please Note: Medication  refills are assigned a new Rx number when refilled electronically. Your pharmacy may indicate that no refills were authorized even though a new prescription for the same medication is available at the pharmacy. Please request the medicine by name with the pharmacy before contacting your provider for a refill.        Referral     A referral request has been sent to our patient care coordination department. Please allow 3-5 business days for us to process this request and contact you either by phone or mail. If you do not hear from us by the 5th business day, please call us at [phone].           [employer] Access Code: Activation code not generated  Current [employer] Status: Active

## 2017-06-07 NOTE — ASSESSMENT & PLAN NOTE
Patient's mother and brother have been diagnosed with melanoma in the past. He does not have any concerning skin moles at this time, but would like a referral to a dermatologist for regular skin exams.

## 2017-06-07 NOTE — ASSESSMENT & PLAN NOTE
Has had multiple nosebleeds recently. He is on coumadin for a mechanical aortic valve. The nosebleeds have improved with using a humidifier and saline nasal spray. He has not needed to go to the ER.

## 2017-06-07 NOTE — ASSESSMENT & PLAN NOTE
This is a chronic issue for the patient. He is on allopurinol 300 mg daily and colchicine 0.6 mg 1.5 tabs daily. He was unable to tolerate being off colchicine due to recurrent flares. Follows with Dr. Ruggiero.

## 2017-06-07 NOTE — ASSESSMENT & PLAN NOTE
Stable. Monitoring BP at home. Currently taking amlodipine-benazepril 5-40mg daily, HCTZ 12.5mg daily and metoprolol XL 100mg daily as directed. Also taking baby aspirin. Denies lightheadedness, vision changes, headache, palpitations or leg swelling.

## 2017-06-07 NOTE — ASSESSMENT & PLAN NOTE
Patient has had several years of tingling in his bilateral feet. He believes it is from Crestor.    Mostly in the mornings  3/10

## 2017-06-08 ENCOUNTER — ANTICOAGULATION MONITORING (OUTPATIENT)
Dept: VASCULAR LAB | Facility: MEDICAL CENTER | Age: 62
End: 2017-06-08

## 2017-06-08 DIAGNOSIS — Z79.01 CHRONIC ANTICOAGULATION: ICD-10-CM

## 2017-06-08 DIAGNOSIS — Z95.2 H/O AORTIC VALVE REPLACEMENT: ICD-10-CM

## 2017-06-09 DIAGNOSIS — I10 ESSENTIAL HYPERTENSION: ICD-10-CM

## 2017-06-09 DIAGNOSIS — Z95.2 H/O AORTIC VALVE REPLACEMENT: ICD-10-CM

## 2017-06-09 DIAGNOSIS — Z13.220 SCREENING FOR HYPERLIPIDEMIA: ICD-10-CM

## 2017-06-30 ENCOUNTER — HOSPITAL ENCOUNTER (OUTPATIENT)
Dept: LAB | Facility: MEDICAL CENTER | Age: 62
End: 2017-06-30
Attending: NURSE PRACTITIONER
Payer: COMMERCIAL

## 2017-06-30 LAB
INR PPP: 3.02 (ref 0.87–1.13)
PROTHROMBIN TIME: 32.2 SEC (ref 12–14.6)

## 2017-06-30 PROCEDURE — 85610 PROTHROMBIN TIME: CPT

## 2017-06-30 PROCEDURE — 36415 COLL VENOUS BLD VENIPUNCTURE: CPT

## 2017-07-03 ENCOUNTER — ANTICOAGULATION MONITORING (OUTPATIENT)
Dept: VASCULAR LAB | Facility: MEDICAL CENTER | Age: 62
End: 2017-07-03

## 2017-07-03 DIAGNOSIS — Z79.01 CHRONIC ANTICOAGULATION: ICD-10-CM

## 2017-07-03 DIAGNOSIS — Z95.2 H/O AORTIC VALVE REPLACEMENT: ICD-10-CM

## 2017-07-03 NOTE — PROGRESS NOTES
OP Anticoagulation Service Note    Date: 7/3/2017    Anticoagulation Summary as of 7/3/2017     INR goal 2.0-3.0   Selected INR 3.02! (6/30/2017)   Maintenance plan 7.5 mg (5 mg x 1.5) on Mon, Wed, Fri; 5 mg (5 mg x 1) all other days   Weekly total 42.5 mg   Plan last modified JESÚS BarreraD (9/12/2016)   Next INR check 7/28/2017   Target end date Indefinite    Indications   Chronic anticoagulation [Z79.01]  H/O aortic valve replacement [Z95.2]         Anticoagulation Episode Summary     INR check location     Preferred lab     Send INR reminders to     Comments       Anticoagulation Care Providers     Provider Role Specialty Phone number    Sheila Neville M.D. Referring Cardiology [phone]    Alyx Moura A.P.N. Responsible Cardiology [phone]    ERIKA PattonP.NLili Responsible Cardiology [phone]        Anticoagulation Patient Findings   Negatives Missed Doses, Extra Doses, Medication Changes, Antibiotic Use, Diet Changes, Dental/Other Procedures, Hospitalization, Bleeding Gums, Nose Bleeds, Blood in Urine, Blood in Stool, Any Bruising, Other Complaints          Plan:  Spoke with patient on the phone. Patient is therapeutic today. Confirmed dosing. No missed tablets in the last week. Patient denies any changes in medications or diet. Patient denies any signs or symptoms of bleeding or clotting. Instructed patient to call clinic if any unusual bleeding or bruising occurs. Will continue dosing as outlined below. Will follow-up with patient in 4 week(s).      Pooja Patton, JESÚSD

## 2017-07-31 ENCOUNTER — TELEPHONE (OUTPATIENT)
Dept: CARDIOLOGY | Facility: MEDICAL CENTER | Age: 62
End: 2017-07-31

## 2017-07-31 NOTE — TELEPHONE ENCOUNTER
Message  Received: Today       COOKIE Royal R.N.       Caller: Unspecified (Today, 8:03 AM)                     Moderate risk to go.     Thanks   TT.       Notified patient of clearance via [facility].    CHEN MANZO

## 2017-07-31 NOTE — TELEPHONE ENCOUNTER
Patient is requesting clearance (due to his history of aortic valve replacement) to [place] with [place] of 13,796 feet.     Forwarded to ADD Dr. Schrader, please advise in Dr. Sheila Neville's absence. Thank you.    CHEN MANZO    Previous Messages       ----- Message -----      From: Félix Elam      Sent: 7/30/2017   4:48 PM        To: Ainsley Florez/James   Subject: Non-Urgent Medical Question                       Hello Dr. Neville.  Hope all is well.  We have the the opportunity to tour the observatories located [place] the [place].  Their caution is the following:   Elevation at the mountain summit of 13,796 feet. Persons with the respiratory or heart conditions, pregnant women, or those in poor health should not access the summit.  I've been that high before but it was pre valve replacement.  I am not worried as it is for a few hours max but always want an opinion from my cardiologist.   Jonas

## 2017-08-15 ENCOUNTER — HOSPITAL ENCOUNTER (OUTPATIENT)
Dept: LAB | Facility: MEDICAL CENTER | Age: 62
End: 2017-08-15
Attending: NURSE PRACTITIONER
Payer: COMMERCIAL

## 2017-08-15 DIAGNOSIS — Z95.2 H/O AORTIC VALVE REPLACEMENT: ICD-10-CM

## 2017-08-15 DIAGNOSIS — Z79.01 CHRONIC ANTICOAGULATION: ICD-10-CM

## 2017-08-15 LAB
INR PPP: 2.25 (ref 0.87–1.13)
PROTHROMBIN TIME: 25.6 SEC (ref 12–14.6)

## 2017-08-15 PROCEDURE — 85610 PROTHROMBIN TIME: CPT

## 2017-08-15 PROCEDURE — 36415 COLL VENOUS BLD VENIPUNCTURE: CPT

## 2017-08-16 ENCOUNTER — ANTICOAGULATION MONITORING (OUTPATIENT)
Dept: VASCULAR LAB | Facility: MEDICAL CENTER | Age: 62
End: 2017-08-16

## 2017-08-16 DIAGNOSIS — Z79.01 CHRONIC ANTICOAGULATION: ICD-10-CM

## 2017-08-16 DIAGNOSIS — Z95.2 H/O AORTIC VALVE REPLACEMENT: ICD-10-CM

## 2017-08-16 NOTE — PROGRESS NOTES
Anticoagulation Summary as of 8/16/2017     INR goal 2.0-3.0   Selected INR 2.25 (8/15/2017)   Maintenance plan 7.5 mg (5 mg x 1.5) on Mon, Wed, Fri; 5 mg (5 mg x 1) all other days   Weekly total 42.5 mg   Plan last modified Gianni Rodgers, PHARMD (9/12/2016)   Next INR check 9/27/2017   Target end date Indefinite    Indications   Chronic anticoagulation [Z79.01]  H/O aortic valve replacement [Z95.2]         Anticoagulation Episode Summary     INR check location     Preferred lab     Send INR reminders to     Comments       Anticoagulation Care Providers     Provider Role Specialty Phone number    Sheila Neville M.D. Referring Cardiology [phone]    Alyx Moura A.P.N. Responsible Cardiology [phone]    ERIKA PattonPASHLEE Responsible Cardiology [phone]        Anticoagulation Patient Findings    Left voicemail message to report a therapeutic INR of 2.25.  Pt to continue with current warfarin dosing regimen. Requested pt contact the clinic for any s/s of unusual bleeding, bruising, clotting or any changes to diet or medication. FU 6 weeks.  Marcio Diaz, PHARMD

## 2017-10-20 ENCOUNTER — HOSPITAL ENCOUNTER (OUTPATIENT)
Dept: LAB | Facility: MEDICAL CENTER | Age: 62
End: 2017-10-20
Attending: FAMILY MEDICINE
Payer: COMMERCIAL

## 2017-10-20 ENCOUNTER — HOSPITAL ENCOUNTER (OUTPATIENT)
Dept: LAB | Facility: MEDICAL CENTER | Age: 62
End: 2017-10-20
Attending: NURSE PRACTITIONER
Payer: COMMERCIAL

## 2017-10-20 ENCOUNTER — ANTICOAGULATION MONITORING (OUTPATIENT)
Dept: VASCULAR LAB | Facility: MEDICAL CENTER | Age: 62
End: 2017-10-20

## 2017-10-20 ENCOUNTER — HOSPITAL ENCOUNTER (OUTPATIENT)
Dept: LAB | Facility: MEDICAL CENTER | Age: 62
End: 2017-10-20
Attending: INTERNAL MEDICINE
Payer: COMMERCIAL

## 2017-10-20 DIAGNOSIS — Z95.2 H/O AORTIC VALVE REPLACEMENT: ICD-10-CM

## 2017-10-20 DIAGNOSIS — I10 ESSENTIAL HYPERTENSION: ICD-10-CM

## 2017-10-20 DIAGNOSIS — Z13.220 SCREENING FOR HYPERLIPIDEMIA: ICD-10-CM

## 2017-10-20 DIAGNOSIS — Z79.01 CHRONIC ANTICOAGULATION: ICD-10-CM

## 2017-10-20 DIAGNOSIS — M1A.09X0 IDIOPATHIC CHRONIC GOUT OF MULTIPLE SITES WITHOUT TOPHUS: ICD-10-CM

## 2017-10-20 DIAGNOSIS — E66.01 MORBID OBESITY DUE TO EXCESS CALORIES (HCC): ICD-10-CM

## 2017-10-20 LAB
ALBUMIN SERPL BCP-MCNC: 4.3 G/DL (ref 3.2–4.9)
ALBUMIN/GLOB SERPL: 2 G/DL
ALP SERPL-CCNC: 43 U/L (ref 30–99)
ALT SERPL-CCNC: 43 U/L (ref 2–50)
ANION GAP SERPL CALC-SCNC: 7 MMOL/L (ref 0–11.9)
AST SERPL-CCNC: 35 U/L (ref 12–45)
BASOPHILS # BLD AUTO: 1.5 % (ref 0–1.8)
BASOPHILS # BLD: 0.06 K/UL (ref 0–0.12)
BILIRUB SERPL-MCNC: 0.7 MG/DL (ref 0.1–1.5)
BUN SERPL-MCNC: 17 MG/DL (ref 8–22)
CALCIUM SERPL-MCNC: 9.2 MG/DL (ref 8.5–10.5)
CHLORIDE SERPL-SCNC: 107 MMOL/L (ref 96–112)
CHOLEST SERPL-MCNC: 205 MG/DL (ref 100–199)
CO2 SERPL-SCNC: 28 MMOL/L (ref 20–33)
CREAT SERPL-MCNC: 0.81 MG/DL (ref 0.5–1.4)
EOSINOPHIL # BLD AUTO: 0.17 K/UL (ref 0–0.51)
EOSINOPHIL NFR BLD: 4.3 % (ref 0–6.9)
ERYTHROCYTE [DISTWIDTH] IN BLOOD BY AUTOMATED COUNT: 47.8 FL (ref 35.9–50)
GFR SERPL CREATININE-BSD FRML MDRD: >60 ML/MIN/1.73 M 2
GLOBULIN SER CALC-MCNC: 2.1 G/DL (ref 1.9–3.5)
GLUCOSE SERPL-MCNC: 102 MG/DL (ref 65–99)
HCT VFR BLD AUTO: 44 % (ref 42–52)
HDLC SERPL-MCNC: 41 MG/DL
HGB BLD-MCNC: 14.4 G/DL (ref 14–18)
IMM GRANULOCYTES # BLD AUTO: 0.01 K/UL (ref 0–0.11)
IMM GRANULOCYTES NFR BLD AUTO: 0.3 % (ref 0–0.9)
INR PPP: 2.85 (ref 0.87–1.13)
INR PPP: 2.85 (ref 2–3.5)
LDLC SERPL CALC-MCNC: 139 MG/DL
LYMPHOCYTES # BLD AUTO: 1.04 K/UL (ref 1–4.8)
LYMPHOCYTES NFR BLD: 26.1 % (ref 22–41)
MCH RBC QN AUTO: 30.9 PG (ref 27–33)
MCHC RBC AUTO-ENTMCNC: 32.7 G/DL (ref 33.7–35.3)
MCV RBC AUTO: 94.4 FL (ref 81.4–97.8)
MONOCYTES # BLD AUTO: 0.31 K/UL (ref 0–0.85)
MONOCYTES NFR BLD AUTO: 7.8 % (ref 0–13.4)
NEUTROPHILS # BLD AUTO: 2.39 K/UL (ref 1.82–7.42)
NEUTROPHILS NFR BLD: 60 % (ref 44–72)
NRBC # BLD AUTO: 0 K/UL
NRBC BLD AUTO-RTO: 0 /100 WBC
PLATELET # BLD AUTO: 97 K/UL (ref 164–446)
PMV BLD AUTO: 12.7 FL (ref 9–12.9)
POTASSIUM SERPL-SCNC: 3.9 MMOL/L (ref 3.6–5.5)
PROT SERPL-MCNC: 6.4 G/DL (ref 6–8.2)
PROTHROMBIN TIME: 30.8 SEC (ref 12–14.6)
RBC # BLD AUTO: 4.66 M/UL (ref 4.7–6.1)
SODIUM SERPL-SCNC: 142 MMOL/L (ref 135–145)
TRIGL SERPL-MCNC: 124 MG/DL (ref 0–149)
URATE SERPL-MCNC: 5.5 MG/DL (ref 2.5–8.3)
WBC # BLD AUTO: 4 K/UL (ref 4.8–10.8)

## 2017-10-20 PROCEDURE — 85610 PROTHROMBIN TIME: CPT

## 2017-10-20 PROCEDURE — 85025 COMPLETE CBC W/AUTO DIFF WBC: CPT

## 2017-10-20 PROCEDURE — 80061 LIPID PANEL: CPT

## 2017-10-20 PROCEDURE — 36415 COLL VENOUS BLD VENIPUNCTURE: CPT

## 2017-10-20 PROCEDURE — 80053 COMPREHEN METABOLIC PANEL: CPT

## 2017-10-20 PROCEDURE — 84550 ASSAY OF BLOOD/URIC ACID: CPT

## 2017-10-20 NOTE — PROGRESS NOTES
Anticoagulation Summary  As of 10/20/2017    INR goal:   2.0-3.0   TTR:   75.2 % (1.8 y)   Today's INR:   2.85   Maintenance plan:   7.5 mg (5 mg x 1.5) on Mon, Wed, Fri; 5 mg (5 mg x 1) all other days   Weekly total:   42.5 mg   Plan last modified:   Gianni Rodgers, PharmD (9/12/2016)   Next INR check:   12/8/2017   Target end date:   Indefinite    Indications    Chronic anticoagulation [Z79.01]  H/O aortic valve replacement [Z95.2]             Anticoagulation Episode Summary     INR check location:       Preferred lab:       Send INR reminders to:       Comments:         Anticoagulation Care Providers     Provider Role Specialty Phone number    Sheila Neville M.D. Referring Cardiology [phone]    Alyx Moura A.P.N. Responsible Cardiology [phone]    Claire Aranda A.P.N. Responsible Cardiology [phone]        Anticoagulation Patient Findings    Tried calling patient but no response. Left voicemail instructing the patient to call us if any signs of bleeding or bruising or any recent changes in diet or medications. Patient's INR is  therapeutic at 2.85.  Patient instructed to continue the current warfarin dosing as shown above. Follow-up in 7 week(s). 12/08/2017    Diana Mae, Pharm.D

## 2017-10-24 ENCOUNTER — OFFICE VISIT (OUTPATIENT)
Dept: CARDIOLOGY | Facility: MEDICAL CENTER | Age: 62
End: 2017-10-24
Payer: COMMERCIAL

## 2017-10-24 VITALS
HEART RATE: 76 BPM | BODY MASS INDEX: 32.23 KG/M2 | HEIGHT: 72 IN | DIASTOLIC BLOOD PRESSURE: 60 MMHG | SYSTOLIC BLOOD PRESSURE: 120 MMHG | OXYGEN SATURATION: 94 % | WEIGHT: 238 LBS

## 2017-10-24 DIAGNOSIS — Z79.01 CHRONIC ANTICOAGULATION: ICD-10-CM

## 2017-10-24 DIAGNOSIS — Z95.2 H/O AORTIC VALVE REPLACEMENT: ICD-10-CM

## 2017-10-24 PROCEDURE — 99214 OFFICE O/P EST MOD 30 MIN: CPT | Performed by: INTERNAL MEDICINE

## 2017-10-24 ASSESSMENT — ENCOUNTER SYMPTOMS
HEARTBURN: 0
MYALGIAS: 0
PALPITATIONS: 0
HEADACHES: 0
DIZZINESS: 0
COUGH: 0
SHORTNESS OF BREATH: 0
NAUSEA: 0
WEIGHT LOSS: 1
LOSS OF CONSCIOUSNESS: 0
BRUISES/BLEEDS EASILY: 0

## 2017-10-24 NOTE — PROGRESS NOTES
Subjective:   Félix Elam is a 62 y.o. male who presents today in follow-up in regards to his aortic root dilation with a history of aortic valve replacement and root repair, he only had minimal coronary disease       Still losing weight, excited  No setbacks  No trouble on Coumadin  He takes his medications as directed and his home blood pressures have been quite excellent    Past Medical History:   Diagnosis Date   • Gout    • Hyperlipidemia    • Hypertension      Past Surgical History:   Procedure Laterality Date   • AORTIC VALVE REPLACEMENT       Family History   Problem Relation Age of Onset   • Heart Disease Father    • Heart Disease Brother    • Cancer Brother      Melanoma   • Other Mother      Gout   • Cancer Mother      Melanoma and SCC   • Other Brother      Parkinson's Disease   • No Known Problems Daughter    • Heart Disease Son      Pulmonary hypertension     History   Smoking Status   • Never Smoker   Smokeless Tobacco   • Never Used     No Known Allergies  Outpatient Encounter Prescriptions as of 10/24/2017   Medication Sig Dispense Refill   • warfarin (COUMADIN) 5 MG Tab TAKE 1 TO 1 AND 1/2 TABLETS BY MOUTH DAILY AS DIRECTED BY COUMADIN CLINIC 135 Tab 1   • ASPIRIN LOW DOSE 81 MG EC tablet TAKE 1 TABLET BY MOUTH DAILY 90 Tab 2   • allopurinol (ZYLOPRIM) 300 MG Tab Take 1 Tab by mouth every day. 90 Tab 1   • colchicine (COLCRYS) 0.6 MG Tab Take 1 Tab by mouth 2 times a day. (Patient taking differently: Take 0.6 mg by mouth every day. 1 1/2 tab QD) 180 Tab 3   • metoprolol SR (TOPROL XL) 100 MG TABLET SR 24 HR TAKE ONE TABLET BY MOUTH EVERY DAY 90 Tab 1   • amlodipine-benazepril (LOTREL) 5-40 MG per capsule Take 1 Cap by mouth every day. 90 Cap 3   • hydrochlorothiazide (HYDRODIURIL) 12.5 MG tablet Take 1 Tab by mouth every day. 90 Tab 3   • rosuvastatin (CRESTOR) 5 MG Tab Take 1 Tab by mouth every evening. 90 Tab 3   • MD ALERT... warfarin (COUMADIN) by Other route as needed.       No  facility-administered encounter medications on file as of 10/24/2017.      Review of Systems   Constitutional: Positive for weight loss. Negative for malaise/fatigue.   Respiratory: Negative for cough and shortness of breath.    Cardiovascular: Negative for chest pain, palpitations and leg swelling.   Gastrointestinal: Negative for heartburn and nausea.   Musculoskeletal: Negative for myalgias.   Skin: Negative for rash.   Neurological: Negative for dizziness, loss of consciousness and headaches.   Endo/Heme/Allergies: Does not bruise/bleed easily.   All other systems reviewed and are negative.       Objective:   /60   Pulse 76   Ht 1.829 m (6')   Wt 108 kg (238 lb)   SpO2 94%   BMI 32.28 kg/m²     Physical Exam   Constitutional: He is oriented to person, place, and time. He appears well-developed. No distress.   HENT:   Mouth/Throat: Mucous membranes are normal.   Eyes: Conjunctivae and EOM are normal.   Neck: No JVD present. No tracheal deviation present. No thyroid mass and no thyromegaly present.   Cardiovascular: Normal rate, regular rhythm and intact distal pulses.    No murmur heard.  Well-healed sternal scar, normal sounding click   Pulmonary/Chest: Effort normal and breath sounds normal. No respiratory distress. He exhibits no tenderness.   Musculoskeletal: Normal range of motion. He exhibits no edema.   Neurological: He is alert and oriented to person, place, and time. He has normal strength. He displays no tremor.   Skin: Skin is warm and dry. He is not diaphoretic.   Psychiatric: He has a normal mood and affect. His behavior is normal.   Vitals reviewed.      Assessment:     1. Chronic anticoagulation  Echocardiogram Comp w/o Cont   2. H/O aortic valve replacement  Echocardiogram Comp w/o Cont       Medical Decision Making:  Today's Assessment / Status / Plan:     Aortic valve replacement, mechanical. Bileaflet. Echo reviewed from 2016. Normal and reassuring  Repeat echocardiogram per  guidelines, next year    Coronary disease, mild  Status CAT scan. He has significant aortopathy including an ascending aortic aneurysm distal to the graft. We will repeat his CAT scan in one to 2 years. I agree with his primary doctor about strict guidelines for his cholesterol    Hypertension, much better controlled weight loss. Medications as directed    Questions answered, RTC based on above, one year  Labs reviewed, follow-up with primary

## 2017-10-25 DIAGNOSIS — Z95.2 H/O AORTIC VALVE REPLACEMENT: ICD-10-CM

## 2017-10-26 RX ORDER — WARFARIN SODIUM 5 MG/1
TABLET ORAL
Qty: 135 TAB | Refills: 1 | Status: SHIPPED | OUTPATIENT
Start: 2017-10-26 | End: 2018-05-05 | Stop reason: SDUPTHER

## 2017-11-14 DIAGNOSIS — Z95.2 S/P AVR: ICD-10-CM

## 2017-11-17 DIAGNOSIS — E78.5 HYPERLIPIDEMIA, UNSPECIFIED HYPERLIPIDEMIA TYPE: ICD-10-CM

## 2017-11-17 RX ORDER — ROSUVASTATIN CALCIUM 5 MG/1
TABLET, COATED ORAL
Qty: 90 TAB | Refills: 3 | Status: SHIPPED | OUTPATIENT
Start: 2017-11-17 | End: 2018-06-04

## 2017-12-18 ENCOUNTER — OFFICE VISIT (OUTPATIENT)
Dept: MEDICAL GROUP | Facility: PHYSICIAN GROUP | Age: 62
End: 2017-12-18
Payer: COMMERCIAL

## 2017-12-18 VITALS
BODY MASS INDEX: 32.23 KG/M2 | WEIGHT: 238 LBS | DIASTOLIC BLOOD PRESSURE: 70 MMHG | RESPIRATION RATE: 16 BRPM | TEMPERATURE: 98.6 F | HEART RATE: 72 BPM | OXYGEN SATURATION: 91 % | SYSTOLIC BLOOD PRESSURE: 134 MMHG | HEIGHT: 72 IN

## 2017-12-18 DIAGNOSIS — R20.2 TINGLING IN EXTREMITIES: ICD-10-CM

## 2017-12-18 DIAGNOSIS — Z95.2 H/O AORTIC VALVE REPLACEMENT: ICD-10-CM

## 2017-12-18 DIAGNOSIS — D69.6 THROMBOCYTOPENIA (HCC): ICD-10-CM

## 2017-12-18 DIAGNOSIS — Z11.59 ENCOUNTER FOR HEPATITIS C SCREENING TEST FOR LOW RISK PATIENT: ICD-10-CM

## 2017-12-18 DIAGNOSIS — D72.9 ABNORMAL WBC COUNT: ICD-10-CM

## 2017-12-18 DIAGNOSIS — E66.9 OBESITY (BMI 30-39.9): ICD-10-CM

## 2017-12-18 DIAGNOSIS — R73.01 ELEVATED FASTING GLUCOSE: ICD-10-CM

## 2017-12-18 DIAGNOSIS — Z79.01 CHRONIC ANTICOAGULATION: ICD-10-CM

## 2017-12-18 DIAGNOSIS — E78.00 PURE HYPERCHOLESTEROLEMIA: ICD-10-CM

## 2017-12-18 PROCEDURE — 99214 OFFICE O/P EST MOD 30 MIN: CPT | Performed by: FAMILY MEDICINE

## 2017-12-18 ASSESSMENT — PAIN SCALES - GENERAL: PAINLEVEL: NO PAIN

## 2017-12-19 PROBLEM — R04.0 FREQUENT EPISTAXIS: Status: RESOLVED | Noted: 2017-06-07 | Resolved: 2017-12-19

## 2017-12-20 NOTE — ASSESSMENT & PLAN NOTE
This is a chronic and stable problem for the patient. Taking medications as prescribed. Labs reviewed with the patient as applicable.    Results for BRITTON LOVELACE (MRN [id number]) as of 12/19/2017 20:39   Ref. Range 3/10/2016 13:55 8/10/2016 09:01 10/20/2017 07:30   Platelet Count Latest Ref Range: 164 - 446 K/uL 87 (L) 104 (L) 97 (L)

## 2017-12-20 NOTE — ASSESSMENT & PLAN NOTE
Patient noted to have a slightly elevated blood glucose level. He has gained weight this year. Denies polydipsia, polyphagia or polyuria. He tells me his goal is to lose 15 pounds.

## 2017-12-20 NOTE — ASSESSMENT & PLAN NOTE
"Patient reports that the tingling sensation in his lower extremities has \"increased 75%\" since stopping rosuvastatin. He is hopeful that he will be able to stay off the medication with improving his diet and exercising.     Denies any leg weakness or numbness.  "

## 2017-12-20 NOTE — ASSESSMENT & PLAN NOTE
Reviewed patient's most recent lipid panel results. They have increased since discontinuation of rosuvastatin. We discussed his risk of cardiovascular disease. It is elevated. Patient would like to wait a few more months and recheck his numbers before restarting the medication.    He did see Dr. Neville recently. CT scan shows an ascending aortic aneurysm. He will be having serial imaging to follow this. Denies fatigue, shortness of breath or chest pain.    Results for ALBANIABRITTON (MRN [id number]) as of 12/19/2017 20:39   Ref. Range 10/20/2017 07:31   Cholesterol,Tot Latest Ref Range: 100 - 199 mg/dL 205 (H)   Triglycerides Latest Ref Range: 0 - 149 mg/dL 124   HDL Latest Ref Range: >=40 mg/dL 41   LDL Latest Ref Range: <100 mg/dL 139 (H)

## 2017-12-20 NOTE — PROGRESS NOTES
"Subjective:   Britton Lovelace is a 62 y.o. male here today for follow-up tingling in extremities and lab results.    Tingling in extremities  Patient reports that the tingling sensation in his lower extremities has \"increased 75%\" since stopping rosuvastatin. He is hopeful that he will be able to stay off the medication with improving his diet and exercising.     Denies any leg weakness or numbness.    Pure hypercholesterolemia  Reviewed patient's most recent lipid panel results. They have increased since discontinuation of rosuvastatin. We discussed his risk of cardiovascular disease. It is elevated. Patient would like to wait a few more months and recheck his numbers before restarting the medication.    He did see Dr. Neville recently. CT scan shows an ascending aortic aneurysm. He will be having serial imaging to follow this. Denies fatigue, shortness of breath or chest pain.    Results for BRITTON LOVELACE (MRN [id number]) as of 12/19/2017 20:39   Ref. Range 10/20/2017 07:31   Cholesterol,Tot Latest Ref Range: 100 - 199 mg/dL 205 (H)   Triglycerides Latest Ref Range: 0 - 149 mg/dL 124   HDL Latest Ref Range: >=40 mg/dL 41   LDL Latest Ref Range: <100 mg/dL 139 (H)     H/O aortic valve replacement  Stable. Patient had an aortic valve replacement in 2010. At the time, his aortic root was enlarged and he was in heart failure. It is mechanical. He will need to be on lifelong anticoagulation and is currently following with the Coumadin clinic. Following with Dr. Sheila Neville.    Abnormal WBC count  Patient requests labwork. He has had a mildly decreased WBC count for the last 1-2 years. No fever, chills, cough, sore throat or abdominal pain.    Results for BRITTON LOVELACE (MRN [id number]) as of 12/19/2017 20:39   Ref. Range 3/10/2016 13:55 8/10/2016 09:01 10/20/2017 07:30   WBC Latest Ref Range: 4.8 - 10.8 K/uL 3.6 (L) 3.8 (L) 4.0 (L)     Thrombocytopenia  This is a chronic and stable problem for the " patient. Taking medications as prescribed. Labs reviewed with the patient as applicable.    Results for BRITTON LOVELACE (MRN [id number]) as of 12/19/2017 20:39   Ref. Range 3/10/2016 13:55 8/10/2016 09:01 10/20/2017 07:30   Platelet Count Latest Ref Range: 164 - 446 K/uL 87 (L) 104 (L) 97 (L)     Elevated fasting glucose  Patient noted to have a slightly elevated blood glucose level. He has gained weight this year. Denies polydipsia, polyphagia or polyuria. He tells me his goal is to lose 15 pounds.     Current medicines (including changes today)  Current Outpatient Prescriptions   Medication Sig Dispense Refill   • rosuvastatin (CRESTOR) 5 MG Tab TAKE 1 TABLET BY MOUTH EVERY NIGHT AT BEDTIME 90 Tab 3   • warfarin (COUMADIN) 5 MG Tab TAKE 1 TO 1.5 TABLETS BY MOUTH DAILY AS DIRECTED BY COUMADIN CLINIC 135 Tab 1   • ASPIRIN LOW DOSE 81 MG EC tablet TAKE 1 TABLET BY MOUTH DAILY 90 Tab 2   • allopurinol (ZYLOPRIM) 300 MG Tab Take 1 Tab by mouth every day. 90 Tab 1   • colchicine (COLCRYS) 0.6 MG Tab Take 1 Tab by mouth 2 times a day. (Patient taking differently: Take 0.6 mg by mouth every day. 1 1/2 tab QD) 180 Tab 3   • metoprolol SR (TOPROL XL) 100 MG TABLET SR 24 HR TAKE ONE TABLET BY MOUTH EVERY DAY 90 Tab 1   • amlodipine-benazepril (LOTREL) 5-40 MG per capsule Take 1 Cap by mouth every day. 90 Cap 3   • hydrochlorothiazide (HYDRODIURIL) 12.5 MG tablet Take 1 Tab by mouth every day. 90 Tab 3   • MD ALERT... warfarin (COUMADIN) by Other route as needed.       No current facility-administered medications for this visit.      He  has a past medical history of Gout; Hyperlipidemia; and Hypertension.    ROS   See HPI. No chest pain, no shortness of breath, no abdominal pain.     Objective:     Physical Exam:  Blood pressure 134/70, pulse 72, temperature 37 °C (98.6 °F), resp. rate 16, height 1.829 m (6'), weight 108 kg (238 lb), SpO2 91 %. Body mass index is 32.28 kg/m².   Constitutional: Alert, overweight, no  distress.  Skin: Warm, dry, good turgor, no rashes in visible areas.  Eye: Equal, round and reactive, conjunctiva clear, lids normal.  ENMT: Lips without lesions, good dentition, oropharynx clear.  Neck: Trachea midline, no masses, no thyromegaly.  Respiratory: Unlabored respiratory effort, lungs clear to auscultation, no wheezes, no ronchi.  Cardiovascular: Normal S1, S2, +Mechanical click without significant murmur, no edema.  Abdomen: Soft, non-tender, no masses, no hepatosplenomegaly.  Psych: Alert and oriented x3, normal affect and mood.    Assessment and Plan:     1. Tingling in extremities  Improved, but not resolved with discontinuation of statin therapy. Now mild and not bothersome to patient. Will continue to monitor, especially if lipid panel does not improve with weight loss.    2. Pure hypercholesterolemia  Not well controlled. 10-year ASCVD risk is 12-15%. Patient is aware that he is at increased risk. He requests a few months to continue to make lifestyle modifications. Will recheck lipid panel in 3 months and recalculate risk. May need to restart rosuvastatin.  - LIPID PROFILE; Future    3. H/O aortic valve replacement  4. Chronic anticoagulation  Chronic and stable. Follows regularly with cardiology. On anticoagulation and following with Coumadin clinic. Continue to monitor.    5. Abnormal WBC count  Mild. Patient is asymptomatic. Follow with serial labs.  - CBC WITH DIFFERENTIAL; Future    6. Thrombocytopenia (CMS-HCC)  Mild. Patient is asymptomatic. Follow with serial labs.  - CBC WITH DIFFERENTIAL; Future    7. Elevated fasting glucose  Mild. Patient is asymptomatic. Follow with serial labs.  - COMP METABOLIC PANEL; Future    8. Encounter for hepatitis C screening test for low risk patient  Screen for hepatitis C per USPSTF guidelines.  - HEP C VIRUS ANTIBODY; Future    9. Obesity (BMI 30-39.9)  Patient's weight was discussed today, including healthy low-carb diet, 30-minutes of moderate exercise  daily and avoiding sugars and high-fat foods.  - Patient identified as having weight management issue.  Appropriate orders and counseling given.    Followup: Return in about 1 year (around 12/18/2018) for Annual.         PLEASE NOTE: This dictation was created using voice recognition software. I have made every reasonable attempt to correct obvious errors, but I expect that there are errors of grammar and possibly content that I did not discover before finalizing the note.

## 2017-12-20 NOTE — ASSESSMENT & PLAN NOTE
Stable. Patient had an aortic valve replacement in 2010. At the time, his aortic root was enlarged and he was in heart failure. It is mechanical. He will need to be on lifelong anticoagulation and is currently following with the Coumadin clinic. Following with Dr. Sheila Neville.

## 2017-12-20 NOTE — ASSESSMENT & PLAN NOTE
Patient requests labwork. He has had a mildly decreased WBC count for the last 1-2 years. No fever, chills, cough, sore throat or abdominal pain.    Results for BRITTON LOVELACE (MRN [id number]) as of 12/19/2017 20:39   Ref. Range 3/10/2016 13:55 8/10/2016 09:01 10/20/2017 07:30   WBC Latest Ref Range: 4.8 - 10.8 K/uL 3.6 (L) 3.8 (L) 4.0 (L)

## 2018-01-03 ENCOUNTER — HOSPITAL ENCOUNTER (OUTPATIENT)
Dept: LAB | Facility: MEDICAL CENTER | Age: 63
End: 2018-01-03
Attending: NURSE PRACTITIONER
Payer: COMMERCIAL

## 2018-01-03 DIAGNOSIS — Z95.2 S/P AVR: ICD-10-CM

## 2018-01-03 LAB
INR PPP: 1.91 (ref 0.87–1.13)
PROTHROMBIN TIME: 21.6 SEC (ref 12–14.6)

## 2018-01-03 PROCEDURE — 85610 PROTHROMBIN TIME: CPT

## 2018-01-03 PROCEDURE — 36415 COLL VENOUS BLD VENIPUNCTURE: CPT

## 2018-01-04 ENCOUNTER — ANTICOAGULATION MONITORING (OUTPATIENT)
Dept: VASCULAR LAB | Facility: MEDICAL CENTER | Age: 63
End: 2018-01-04

## 2018-01-04 DIAGNOSIS — Z95.2 H/O AORTIC VALVE REPLACEMENT: ICD-10-CM

## 2018-01-04 DIAGNOSIS — Z79.01 CHRONIC ANTICOAGULATION: ICD-10-CM

## 2018-01-04 NOTE — PROGRESS NOTES
Anticoagulation Summary  As of 1/4/2018    INR goal:   2.0-3.0   TTR:   76.8 % (2 y)   Today's INR:   1.91! (1/3/2018)   Maintenance plan:   7.5 mg (5 mg x 1.5) on Mon, Wed, Fri; 5 mg (5 mg x 1) all other days   Weekly total:   42.5 mg   Plan last modified:   Gianni Rodgers, PharmD (9/12/2016)   Next INR check:   2/1/2018   Target end date:   Indefinite    Indications    Chronic anticoagulation [Z79.01]  H/O aortic valve replacement [Z95.2]             Anticoagulation Episode Summary     INR check location:       Preferred lab:       Send INR reminders to:       Comments:         Anticoagulation Care Providers     Provider Role Specialty Phone number    Sheila Neville M.D. Referring Cardiology [phone]    ERIKA BorjaPCATHY. Responsible Cardiology [phone]    ERIKA PattonPASHLEE Responsible Cardiology [phone]        Anticoagulation Patient Findings      Spoke with patient.  INR is SUB therapeutic.   Pt was on antibiotics, finished a few days ago.  Pt denies any unusual s/s of bleeding, bruising, clotting or any changes to diet or medications. Denies any etoh, cranberries, supplements, or illness.   Pt verifies warfarin weekly dosing.     Will have pt boost his dose today to 7.5mg x1 and then resume his normal warfarin dosing.     Repeat INR in 4 weeks.     Ligia Tovar, PharmD

## 2018-01-06 ENCOUNTER — NON-PROVIDER VISIT (OUTPATIENT)
Dept: URGENT CARE | Facility: CLINIC | Age: 63
End: 2018-01-06

## 2018-01-06 DIAGNOSIS — Z11.1 PPD SCREENING TEST: ICD-10-CM

## 2018-01-06 PROCEDURE — 86580 TB INTRADERMAL TEST: CPT | Performed by: PHYSICIAN ASSISTANT

## 2018-01-08 ENCOUNTER — NON-PROVIDER VISIT (OUTPATIENT)
Dept: URGENT CARE | Facility: CLINIC | Age: 63
End: 2018-01-08

## 2018-01-08 ENCOUNTER — HOSPITAL ENCOUNTER (OUTPATIENT)
Dept: CARDIOLOGY | Facility: MEDICAL CENTER | Age: 63
End: 2018-01-08
Attending: INTERNAL MEDICINE
Payer: COMMERCIAL

## 2018-01-08 DIAGNOSIS — Z79.01 CHRONIC ANTICOAGULATION: ICD-10-CM

## 2018-01-08 DIAGNOSIS — Z95.2 H/O AORTIC VALVE REPLACEMENT: ICD-10-CM

## 2018-01-08 LAB — TB WHEAL 3D P 5 TU DIAM: NORMAL MM

## 2018-01-08 PROCEDURE — 93306 TTE W/DOPPLER COMPLETE: CPT

## 2018-01-08 PROCEDURE — 93306 TTE W/DOPPLER COMPLETE: CPT | Mod: 26 | Performed by: INTERNAL MEDICINE

## 2018-01-08 NOTE — PROGRESS NOTES
Félix Elam is a 62 y.o. male here for a non-provider visit for PPD reading -- Step 1 of 1.      1.  Resulted in Epic under enter/edit results? Yes   2.  TB evaluation questionnaire scanned into chart and original given to patient?Yes      3. Was induration greater than 0 mm? No.    Routed to PCP? No

## 2018-01-09 ENCOUNTER — TELEPHONE (OUTPATIENT)
Dept: CARDIOLOGY | Facility: MEDICAL CENTER | Age: 63
End: 2018-01-09

## 2018-01-09 LAB
LV EJECT FRACT  99904: 50
LV EJECT FRACT MOD 2C 99903: 48.92
LV EJECT FRACT MOD 4C 99902: 46.09
LV EJECT FRACT MOD BP 99901: 47.65

## 2018-01-09 NOTE — TELEPHONE ENCOUNTER
----- Message -----  From: Sheila Neville M.D.  Sent: 1/9/2018  11:31 AM  To: Joyce Bright R.N.    No significant change in heart, good news  Heart valve still looks great    ===========================================================    S/w pt, discussed Echo results per Dr Neville, pt verbalizes understanding

## 2018-01-30 DIAGNOSIS — Z79.01 CHRONIC ANTICOAGULATION: ICD-10-CM

## 2018-01-30 DIAGNOSIS — I10 ESSENTIAL HYPERTENSION, BENIGN: ICD-10-CM

## 2018-01-30 RX ORDER — HYDROCHLOROTHIAZIDE 12.5 MG/1
TABLET ORAL
Qty: 90 TAB | Refills: 2 | Status: SHIPPED | OUTPATIENT
Start: 2018-01-30

## 2018-02-07 ENCOUNTER — HOSPITAL ENCOUNTER (OUTPATIENT)
Dept: LAB | Facility: MEDICAL CENTER | Age: 63
End: 2018-02-07
Attending: NURSE PRACTITIONER
Payer: COMMERCIAL

## 2018-02-07 LAB
INR PPP: 2.54 (ref 0.87–1.13)
PROTHROMBIN TIME: 27 SEC (ref 12–14.6)

## 2018-02-07 PROCEDURE — 85610 PROTHROMBIN TIME: CPT

## 2018-02-07 PROCEDURE — 36415 COLL VENOUS BLD VENIPUNCTURE: CPT

## 2018-02-08 ENCOUNTER — ANTICOAGULATION MONITORING (OUTPATIENT)
Dept: VASCULAR LAB | Facility: MEDICAL CENTER | Age: 63
End: 2018-02-08

## 2018-02-08 DIAGNOSIS — Z95.2 H/O AORTIC VALVE REPLACEMENT: ICD-10-CM

## 2018-02-08 DIAGNOSIS — Z79.01 CHRONIC ANTICOAGULATION: ICD-10-CM

## 2018-02-08 NOTE — PROGRESS NOTES
Anticoagulation Summary  As of 2/8/2018    INR goal:   2.0-3.0   TTR:   77.2 % (2.1 y)   Today's INR:   2.54 (2/7/2018)   Maintenance plan:   7.5 mg (5 mg x 1.5) on Mon, Wed, Fri; 5 mg (5 mg x 1) all other days   Weekly total:   42.5 mg   Plan last modified:   Gianni Rodgers, PharmD (9/12/2016)   Next INR check:   3/22/2018   Target end date:   Indefinite    Indications    Chronic anticoagulation [Z79.01]  H/O aortic valve replacement [Z95.2]             Anticoagulation Episode Summary     INR check location:       Preferred lab:       Send INR reminders to:       Comments:         Anticoagulation Care Providers     Provider Role Specialty Phone number    Sheila Neville M.D. Referring Cardiology [phone]    CIARA Borja.P.N. Responsible Cardiology [phone]    ERIKA PattonP.NLili Responsible Cardiology [phone]        Anticoagulation Patient Findings      Left voicemail message to report a therapeutic INR of 2.54.     Pt to continue with current warfarin dosing regimen. Requested pt contact the clinic for any s/s of unusual bleeding, bruising, clotting or any changes to diet or medication.    FU INR in 6 weeks.    Ligia Tovar, PharmD

## 2018-02-09 DIAGNOSIS — I10 ESSENTIAL HYPERTENSION, BENIGN: ICD-10-CM

## 2018-02-09 RX ORDER — METOPROLOL SUCCINATE 100 MG/1
TABLET, EXTENDED RELEASE ORAL
Qty: 90 TAB | Refills: 3 | Status: SHIPPED | OUTPATIENT
Start: 2018-02-09

## 2018-02-12 ENCOUNTER — OFFICE VISIT (OUTPATIENT)
Dept: RHEUMATOLOGY | Facility: PHYSICIAN GROUP | Age: 63
End: 2018-02-12
Payer: COMMERCIAL

## 2018-02-12 VITALS
RESPIRATION RATE: 14 BRPM | TEMPERATURE: 97.9 F | HEART RATE: 62 BPM | BODY MASS INDEX: 31.87 KG/M2 | SYSTOLIC BLOOD PRESSURE: 140 MMHG | DIASTOLIC BLOOD PRESSURE: 70 MMHG | OXYGEN SATURATION: 94 % | WEIGHT: 235 LBS

## 2018-02-12 DIAGNOSIS — Z79.01 CHRONIC ANTICOAGULATION: ICD-10-CM

## 2018-02-12 DIAGNOSIS — Z95.2 H/O AORTIC VALVE REPLACEMENT: ICD-10-CM

## 2018-02-12 DIAGNOSIS — E78.00 PURE HYPERCHOLESTEROLEMIA: ICD-10-CM

## 2018-02-12 DIAGNOSIS — M1A.09X0 IDIOPATHIC CHRONIC GOUT OF MULTIPLE SITES WITHOUT TOPHUS: ICD-10-CM

## 2018-02-12 DIAGNOSIS — E66.01 MORBID OBESITY DUE TO EXCESS CALORIES (HCC): ICD-10-CM

## 2018-02-12 PROCEDURE — 99214 OFFICE O/P EST MOD 30 MIN: CPT | Performed by: INTERNAL MEDICINE

## 2018-02-12 RX ORDER — ALLOPURINOL 300 MG/1
300 TABLET ORAL
Qty: 90 TAB | Refills: 1 | Status: SHIPPED | OUTPATIENT
Start: 2018-02-12

## 2018-02-12 NOTE — LETTER
[facility]   [address], [address]  [place] [address]  Phone: [phone]  Fax: [phone]              Encounter Date: 2/12/2018    Dear Dr. Diaz ref. provider found,    It was a pleasure seeing your patient, Félix Elam, on 2/12/2018. Diagnoses of Idiopathic chronic gout of multiple sites without tophus, Chronic anticoagulation, H/O aortic valve replacement, Pure hypercholesterolemia, and Morbid obesity due to excess calories (CMS-[facility]) were pertinent to this visit.     Please find attached progress note which includes the history I obtained from Mr. Elam, my physical examination findings, my impression and recommendations.      Once again, it was a pleasure participating in your patient's care.  Please feel free to contact me if you have any questions or if I can be of any further assistance to your patients.      Sincerely,    Kira Ruggiero M.D.  Electronically Signed          PROGRESS NOTE:  No notes on file

## 2018-02-12 NOTE — PROGRESS NOTES
Chief Complaint- joint pain    Subjective:   Félix Elam is a 62 y.o. male here today for follow up of rheumatological issues    This is a follow-up visit for this patient with gout, last seen February 2017 Dx with gout 2008 in [place] with right great toe swelling, treated at the time symptomatically, with repeat of gout flare 6 months later, then gout progressed to toe, ankle and knee all on right side   Patient currently on  allopurinol 300 mg po qday since about 2010 and colchicine 0.6 mg po bid   Since the last visit, patient states he's had no gout flares, patient is not exactly perfect with monitoring his diet, still has beer and shellfish occasionally. Patient denies any chronic skin conditions, no history of psoriasis or eczema, denies any chronic anemia issues, denies any colitis, denies any diabetes mellitus, denies any thyroid problems, no iritis or uveitis in the past,  Patient asks this visit whether he could try to start tapering down colchicine.      Additional comorbidities include  a history of aortic valve stenosis with resultant mechanical valve replacement, on chronic anticoagulation for such     Patient continues to try to be cognizant of his walking and uses a fit bit to monitor his physical activity.     Uric acid 4.5 11/2015; Uric acid 4.1 9/2016; Uric acid 5.5 10/2017 (on Allopurinol 300 mg/day)        Current medicines (including changes today)  Current Outpatient Prescriptions   Medication Sig Dispense Refill   • allopurinol (ZYLOPRIM) 300 MG Tab Take 1 Tab by mouth every day. 90 Tab 1   • metoprolol SR (TOPROL XL) 100 MG TABLET SR 24 HR TAKE 1 TABLET BY MOUTH EVERY DAY 90 Tab 3   • hydrochlorothiazide (HYDRODIURIL) 12.5 MG tablet TAKE 1 TABLET BY MOUTH EVERY DAY 90 Tab 2   • amlodipine-benazepril (LOTREL) 5-40 MG per capsule Take 1 Cap by mouth every day. 90 Cap 3   • warfarin (COUMADIN) 5 MG Tab TAKE 1 TO 1.5 TABLETS BY MOUTH DAILY AS DIRECTED BY COUMADIN CLINIC 135 Tab 1   •  ASPIRIN LOW DOSE 81 MG EC tablet TAKE 1 TABLET BY MOUTH DAILY 90 Tab 2   • rosuvastatin (CRESTOR) 5 MG Tab TAKE 1 TABLET BY MOUTH EVERY NIGHT AT BEDTIME 90 Tab 3   • MD ALERT... warfarin (COUMADIN) by Other route as needed.       No current facility-administered medications for this visit.      He  has a past medical history of Gout; Hyperlipidemia; and Hypertension.    ROS   Other than what is mentioned in HPI or physical exam, there is no history of headaches, double vision or blurred vision. No temporal tenderness or jaw claudication. No history of cataracts or glaucoma. No trouble swallowing difficulties or sore throats. No history of thyroid disease. No chest complaints including chest pain, cough or sputum production. No shortness of breath. No GI complaints including nausea, vomiting, change in bowel habits, or past peptic ulcer disease. No history of blood in the stools. No urinary complaints including dysuria or frequency. No history of rash including psoriasis. No history of alopecia, photosensitivity, oral ulcerations, Raynaud's phenomena, or swollen joints. No history of gout. No back complaints. No history of low blood counts.       Objective:     Blood pressure 140/70, pulse 62, temperature 36.6 °C (97.9 °F), resp. rate 14, weight 106.6 kg (235 lb), SpO2 94 %. Body mass index is 31.87 kg/m².   Physical Exam:  Constitutional: Alert and oriented X3, no distress.Skin: Warm, dry, good turgor, no rashes in visible areas Did not appreciate any psoriatic plaques or discoid lesionsEye: Equal, round and reactive, conjunctiva clear, lids normal EOM intactENMT: Lips without lesions, good dentition, no oropharyngeal ulcers, moist buccal mucosa, pinna without deformityNeck: Trachea midline, no masses, no thyromegaly.Lymph: no cervical lymphadenopathy, no axillary lymphadenopathy, no supraclavicular lymphadenopathyRespiratory: Unlabored respiratory effort, lungs clear to auscultation, no wheezes, no  earlene.Cardiovascular: Normal S1, S2,There is a loud click compatible with patient's artificial aortic valve, also with a 3/6 systolic ejection murmur heard at the right sternal border, no edema.Abdomen: Soft, non-tender, no masses, no hepatosplenomegaly positive central obesityPsych: Alert and oriented x3, normal affect and mood.Neuro: Cranial nerves 2-12 are grossly intact, no loss of sensation LEExt:no joint laxity noted in bilateral arms, no joint laxity noted in bilateral legs, no nodule, no tophi, no ulnar deviation, no flexure contractures, no swan-neck or boutonniere deformities, no sausage digits, evidence of Heber[place] nodes, no tophi, no nodules, no tophi.    Lab Results   Component Value Date/Time    HEPCAB Negative 09/19/2016 02:25 PM     Lab Results   Component Value Date/Time    SODIUM 142 10/20/2017 07:30 AM    POTASSIUM 3.9 10/20/2017 07:30 AM    CHLORIDE 107 10/20/2017 07:30 AM    CO2 28 10/20/2017 07:30 AM    GLUCOSE 102 (H) 10/20/2017 07:30 AM    BUN 17 10/20/2017 07:30 AM    CREATININE 0.81 10/20/2017 07:30 AM      Lab Results   Component Value Date/Time    WBC 4.0 (L) 10/20/2017 07:30 AM    RBC 4.66 (L) 10/20/2017 07:30 AM    HEMOGLOBIN 14.4 10/20/2017 07:30 AM    HEMATOCRIT 44.0 10/20/2017 07:30 AM    MCV 94.4 10/20/2017 07:30 AM    MCH 30.9 10/20/2017 07:30 AM    MCHC 32.7 (L) 10/20/2017 07:30 AM    MPV 12.7 10/20/2017 07:30 AM    NEUTSPOLYS 60.00 10/20/2017 07:30 AM    LYMPHOCYTES 26.10 10/20/2017 07:30 AM    MONOCYTES 7.80 10/20/2017 07:30 AM    EOSINOPHILS 4.30 10/20/2017 07:30 AM    BASOPHILS 1.50 10/20/2017 07:30 AM      Lab Results   Component Value Date/Time    CALCIUM 9.2 10/20/2017 07:30 AM    ASTSGOT 35 10/20/2017 07:30 AM    ALTSGPT 43 10/20/2017 07:30 AM    ALKPHOSPHAT 43 10/20/2017 07:30 AM    TBILIRUBIN 0.7 10/20/2017 07:30 AM    ALBUMIN 4.3 10/20/2017 07:30 AM    TOTPROTEIN 6.4 10/20/2017 07:30 AM     Lab Results   Component Value Date/Time    URICACID 5.5 10/20/2017 07:30 AM     ANTINUCAB None Detected 09/19/2016 02:25 PM      Assessment and Plan:     1. Idiopathic chronic gout of multiple sites without tophus  Continue allopurinol at 300 mg by mouth daily, no side effects noted, labs to be done every 6 months, labs ordered for patient we will refill allopurinol today  Start tapering off of colchicine by half tablet every 2 weeks, of note in the remote past patient reported that when tapered off colchicine he felt more achy, this was brought up in visit today and patient states understanding  - allopurinol (ZYLOPRIM) 300 MG Tab; Take 1 Tab by mouth every day.  Dispense: 90 Tab; Refill: 1  - COMP METABOLIC PANEL; Future  - CBC WITH DIFFERENTIAL; Future  - URIC ACID; Future    2. Chronic anticoagulation  This will impact with kind of medications we can use for this patient's arthritis, NSAIDs are contraindicated because of increased risk of bleeding while on chronic anticoagulation  - allopurinol (ZYLOPRIM) 300 MG Tab; Take 1 Tab by mouth every day.  Dispense: 90 Tab; Refill: 1  - COMP METABOLIC PANEL; Future  - CBC WITH DIFFERENTIAL; Future  - URIC ACID; Future    3. H/O aortic valve replacement  On chronic anticoagulation  This will impact with kind of medications we can use for this patient's arthritis, NSAIDs are contraindicated because of increased risk of bleeding while on chronic anticoagulation  - allopurinol (ZYLOPRIM) 300 MG Tab; Take 1 Tab by mouth every day.  Dispense: 90 Tab; Refill: 1  - COMP METABOLIC PANEL; Future  - CBC WITH DIFFERENTIAL; Future  - URIC ACID; Future    4. Pure hypercholesterolemia  Followed by primary care doctor and patient is working on losing weight to bring down cholesterol    5. Morbid obesity due to excess calories (CMS-HCC)  Exacerbates the patient's arthritis, recommend to lose weight, patient states understanding  - allopurinol (ZYLOPRIM) 300 MG Tab; Take 1 Tab by mouth every day.  Dispense: 90 Tab; Refill: 1  - COMP METABOLIC PANEL; Future  - CBC  WITH DIFFERENTIAL; Future  - URIC ACID; Future    Followup: Return in about 1 year (around 2/12/2019). or sooner prn    Patient was seen 30 minutes face-to-face of which more than 50% of the time was spent counseling the patient (excluding time for procedures)  regarding  rheumatological condition and care. Therapy was discussed in detail.    Please note that this dictation was created using voice recognition software. I have made every reasonable attempt to correct obvious errors, but I expect that there are errors of grammar and possibly content that I did not discover before finalizing the note.

## 2018-03-14 ENCOUNTER — HOSPITAL ENCOUNTER (OUTPATIENT)
Dept: LAB | Facility: MEDICAL CENTER | Age: 63
End: 2018-03-14
Attending: NURSE PRACTITIONER
Payer: COMMERCIAL

## 2018-03-14 LAB
INR PPP: 3.17 (ref 0.87–1.13)
PROTHROMBIN TIME: 32.2 SEC (ref 12–14.6)

## 2018-03-14 PROCEDURE — 36415 COLL VENOUS BLD VENIPUNCTURE: CPT

## 2018-03-14 PROCEDURE — 85610 PROTHROMBIN TIME: CPT

## 2018-03-15 ENCOUNTER — ANTICOAGULATION MONITORING (OUTPATIENT)
Dept: VASCULAR LAB | Facility: MEDICAL CENTER | Age: 63
End: 2018-03-15

## 2018-03-15 DIAGNOSIS — Z95.2 H/O AORTIC VALVE REPLACEMENT: ICD-10-CM

## 2018-03-15 DIAGNOSIS — Z79.01 CHRONIC ANTICOAGULATION: ICD-10-CM

## 2018-03-15 NOTE — PROGRESS NOTES
Anticoagulation Summary  As of 3/15/2018    INR goal:   2.0-3.0   TTR:   77.0 % (2.2 y)   Today's INR:   3.17! (3/14/2018)   Maintenance plan:   7.5 mg (5 mg x 1.5) on Mon, Wed, Fri; 5 mg (5 mg x 1) all other days   Weekly total:   42.5 mg   Plan last modified:   Gianni Rodgers, PharmD (9/12/2016)   Next INR check:   4/5/2018   Target end date:   Indefinite    Indications    Chronic anticoagulation [Z79.01]  H/O aortic valve replacement [Z95.2]             Anticoagulation Episode Summary     INR check location:       Preferred lab:       Send INR reminders to:       Comments:         Anticoagulation Care Providers     Provider Role Specialty Phone number    Sheila Neville M.D. Referring Cardiology [phone]    CIARA Borja.P.N. Responsible Cardiology [phone]    ERIKA PattonP.NLili Responsible Cardiology [phone]        Anticoagulation Patient Findings      Left voicemail message to report a SUPRA therapeutic INR of 3.17.    Will have pt take a decreased dose of warfarin today of 2.5 mg and then   Pt to continue with current warfarin dosing regimen. Requested pt contact the clinic for any s/s of unusual bleeding, bruising, clotting or any changes to diet or medication.    FU INR in 3 week(s).    Ligia Tovar, PharmD

## 2018-04-02 ENCOUNTER — TELEPHONE (OUTPATIENT)
Dept: VASCULAR LAB | Facility: MEDICAL CENTER | Age: 63
End: 2018-04-02

## 2018-04-02 DIAGNOSIS — Z91.89: ICD-10-CM

## 2018-04-02 DIAGNOSIS — Z95.2 H/O AORTIC VALVE REPLACEMENT: ICD-10-CM

## 2018-04-02 RX ORDER — LEVOFLOXACIN 500 MG/1
500 TABLET, FILM COATED ORAL DAILY
Qty: 10 TAB | Refills: 3 | Status: SHIPPED | OUTPATIENT
Start: 2018-04-02 | End: 2018-04-12

## 2018-04-02 NOTE — TELEPHONE ENCOUNTER
Received notification that Levaquin was prescribed for the pt.   Asked pt to call back so can discuss DDI.   Reminded due for INR on 4/5/18     Ligia Tovar, FelisaD

## 2018-04-20 ENCOUNTER — HOSPITAL ENCOUNTER (OUTPATIENT)
Dept: LAB | Facility: MEDICAL CENTER | Age: 63
End: 2018-04-20
Attending: NURSE PRACTITIONER
Payer: COMMERCIAL

## 2018-04-20 ENCOUNTER — APPOINTMENT (RX ONLY)
Dept: URBAN - METROPOLITAN AREA CLINIC 4 | Facility: CLINIC | Age: 63
Setting detail: DERMATOLOGY
End: 2018-04-20

## 2018-04-20 DIAGNOSIS — D22 MELANOCYTIC NEVI: ICD-10-CM

## 2018-04-20 DIAGNOSIS — L81.4 OTHER MELANIN HYPERPIGMENTATION: ICD-10-CM

## 2018-04-20 DIAGNOSIS — D18.0 HEMANGIOMA: ICD-10-CM

## 2018-04-20 DIAGNOSIS — D36.1 BENIGN NEOPLASM OF PERIPHERAL NERVES AND AUTONOMIC NERVOUS SYSTEM: ICD-10-CM

## 2018-04-20 DIAGNOSIS — L82.1 OTHER SEBORRHEIC KERATOSIS: ICD-10-CM

## 2018-04-20 DIAGNOSIS — L91.8 OTHER HYPERTROPHIC DISORDERS OF THE SKIN: ICD-10-CM

## 2018-04-20 PROBLEM — D36.14 BENIGN NEOPLASM OF PERIPHERAL NERVES AND AUTONOMIC NERVOUS SYSTEM OF THORAX: Status: ACTIVE | Noted: 2018-04-20

## 2018-04-20 PROBLEM — D22.72 MELANOCYTIC NEVI OF LEFT LOWER LIMB, INCLUDING HIP: Status: ACTIVE | Noted: 2018-04-20

## 2018-04-20 PROBLEM — D22.71 MELANOCYTIC NEVI OF RIGHT LOWER LIMB, INCLUDING HIP: Status: ACTIVE | Noted: 2018-04-20

## 2018-04-20 PROBLEM — D48.5 NEOPLASM OF UNCERTAIN BEHAVIOR OF SKIN: Status: ACTIVE | Noted: 2018-04-20

## 2018-04-20 PROBLEM — D22.5 MELANOCYTIC NEVI OF TRUNK: Status: ACTIVE | Noted: 2018-04-20

## 2018-04-20 PROBLEM — D22.61 MELANOCYTIC NEVI OF RIGHT UPPER LIMB, INCLUDING SHOULDER: Status: ACTIVE | Noted: 2018-04-20

## 2018-04-20 PROBLEM — D22.62 MELANOCYTIC NEVI OF LEFT UPPER LIMB, INCLUDING SHOULDER: Status: ACTIVE | Noted: 2018-04-20

## 2018-04-20 PROBLEM — D18.01 HEMANGIOMA OF SKIN AND SUBCUTANEOUS TISSUE: Status: ACTIVE | Noted: 2018-04-20

## 2018-04-20 PROBLEM — Z85.828 PERSONAL HISTORY OF OTHER MALIGNANT NEOPLASM OF SKIN: Status: ACTIVE | Noted: 2018-04-20

## 2018-04-20 LAB
INR PPP: 2.26 (ref 0.87–1.13)
PROTHROMBIN TIME: 24.6 SEC (ref 12–14.6)

## 2018-04-20 PROCEDURE — 99203 OFFICE O/P NEW LOW 30 MIN: CPT | Mod: 25

## 2018-04-20 PROCEDURE — ? COUNSELING

## 2018-04-20 PROCEDURE — ? BIOPSY BY SHAVE METHOD

## 2018-04-20 PROCEDURE — 85610 PROTHROMBIN TIME: CPT

## 2018-04-20 PROCEDURE — 36415 COLL VENOUS BLD VENIPUNCTURE: CPT

## 2018-04-20 PROCEDURE — 11100: CPT

## 2018-04-20 ASSESSMENT — LOCATION ZONE DERM
LOCATION ZONE: ARM
LOCATION ZONE: LEG
LOCATION ZONE: FACE
LOCATION ZONE: TRUNK
LOCATION ZONE: AXILLAE

## 2018-04-20 ASSESSMENT — LOCATION SIMPLE DESCRIPTION DERM
LOCATION SIMPLE: CHEST
LOCATION SIMPLE: RIGHT CALF
LOCATION SIMPLE: RIGHT FOREARM
LOCATION SIMPLE: LEFT SHOULDER
LOCATION SIMPLE: LEFT THIGH
LOCATION SIMPLE: RIGHT SHOULDER
LOCATION SIMPLE: LEFT POSTERIOR THIGH
LOCATION SIMPLE: LEFT UPPER ARM
LOCATION SIMPLE: LEFT EYEBROW
LOCATION SIMPLE: LEFT AXILLARY VAULT
LOCATION SIMPLE: LEFT UPPER BACK
LOCATION SIMPLE: LEFT CALF
LOCATION SIMPLE: RIGHT THIGH
LOCATION SIMPLE: LEFT FOREARM
LOCATION SIMPLE: RIGHT POSTERIOR THIGH
LOCATION SIMPLE: RIGHT UPPER BACK
LOCATION SIMPLE: RIGHT UPPER ARM

## 2018-04-20 ASSESSMENT — LOCATION DETAILED DESCRIPTION DERM
LOCATION DETAILED: LEFT MEDIAL SUPERIOR CHEST
LOCATION DETAILED: LEFT SUPERIOR UPPER BACK
LOCATION DETAILED: LEFT POSTERIOR SHOULDER
LOCATION DETAILED: LEFT PROXIMAL POSTERIOR UPPER ARM
LOCATION DETAILED: RIGHT DISTAL POSTERIOR THIGH
LOCATION DETAILED: RIGHT LATERAL SUPERIOR CHEST
LOCATION DETAILED: RIGHT VENTRAL PROXIMAL FOREARM
LOCATION DETAILED: LEFT AXILLARY VAULT
LOCATION DETAILED: RIGHT PROXIMAL DORSAL FOREARM
LOCATION DETAILED: LEFT DISTAL POSTERIOR THIGH
LOCATION DETAILED: RIGHT DISTAL POSTERIOR UPPER ARM
LOCATION DETAILED: LEFT SUPERIOR LATERAL UPPER BACK
LOCATION DETAILED: LEFT PROXIMAL DORSAL FOREARM
LOCATION DETAILED: LEFT LATERAL SUPERIOR CHEST
LOCATION DETAILED: LEFT ANTERIOR DISTAL THIGH
LOCATION DETAILED: RIGHT PROXIMAL CALF
LOCATION DETAILED: RIGHT ANTERIOR DISTAL THIGH
LOCATION DETAILED: RIGHT SUPERIOR MEDIAL UPPER BACK
LOCATION DETAILED: RIGHT VENTRAL DISTAL FOREARM
LOCATION DETAILED: LEFT LATERAL EYEBROW
LOCATION DETAILED: RIGHT POSTERIOR SHOULDER
LOCATION DETAILED: LEFT VENTRAL DISTAL FOREARM
LOCATION DETAILED: LEFT VENTRAL PROXIMAL FOREARM
LOCATION DETAILED: LEFT PROXIMAL CALF
LOCATION DETAILED: RIGHT MEDIAL INFERIOR CHEST

## 2018-04-20 NOTE — PROCEDURE: BIOPSY BY SHAVE METHOD
Was A Bandage Applied: Yes
Post-Care Instructions: I reviewed with the patient in detail post-care instructions. Patient is to keep the biopsy site dry overnight, and then apply bacitracin twice daily until healed. Patient may apply hydrogen peroxide soaks to remove any crusting.
Hemostasis: Electrocautery
Cryotherapy Text: The wound bed was treated with cryotherapy after the biopsy was performed.
Size Of Lesion In Cm: 0
Notification Instructions: Patient will be notified of biopsy results. However, patient instructed to call the office if not contacted within 2 weeks.
Bill For Surgical Tray: no
Anesthesia Type: 1% lidocaine with epinephrine
Anesthesia Volume In Cc: 0.5
Billing Type: Third-Party Bill
Consent: Written consent was obtained and risks were reviewed including but not limited to scarring, infection, bleeding, scabbing, incomplete removal, nerve damage and allergy to anesthesia.
Type Of Destruction Used: Electrodesiccation
Wound Care: Vaseline
Biopsy Method: Dermablade
Lab: [id number]
Biopsy Type: H and E
Dressing: bandage
Detail Level: Detailed
Silver Nitrate Text: The wound bed was treated with silver nitrate after the biopsy was performed.
Electrodesiccation Text: The wound bed was treated with electrodesiccation after the biopsy was performed.
Electrodesiccation And Curettage Text: The wound bed was treated with electrodesiccation and curettage after the biopsy was performed.
Curettage Text: The wound bed was treated with curettage after the biopsy was performed.
Lab Facility:

## 2018-04-23 ENCOUNTER — ANTICOAGULATION MONITORING (OUTPATIENT)
Dept: VASCULAR LAB | Facility: MEDICAL CENTER | Age: 63
End: 2018-04-23

## 2018-04-23 DIAGNOSIS — Z79.01 CHRONIC ANTICOAGULATION: ICD-10-CM

## 2018-04-23 DIAGNOSIS — Z95.2 H/O AORTIC VALVE REPLACEMENT: ICD-10-CM

## 2018-04-23 NOTE — PROGRESS NOTES
Anticoagulation Summary  As of 4/23/2018    INR goal:   2.0-3.0   TTR:   77.2 % (2.3 y)   Today's INR:   2.26 (4/20/2018)   Maintenance plan:   7.5 mg (5 mg x 1.5) on Mon, Wed, Fri; 5 mg (5 mg x 1) all other days   Weekly total:   42.5 mg   Plan last modified:   Gianni Rodgers, PharmD (9/12/2016)   Next INR check:   5/21/2018   Target end date:   Indefinite    Indications    Chronic anticoagulation [Z79.01]  H/O aortic valve replacement [Z95.2]             Anticoagulation Episode Summary     INR check location:       Preferred lab:       Send INR reminders to:       Comments:         Anticoagulation Care Providers     Provider Role Specialty Phone number    Sheila Neville M.D. Referring Cardiology [phone]    ERIKA BorjaP.N. Responsible Cardiology [phone]    ERIKA PattonPLiliNLili Responsible Cardiology [phone]        Anticoagulation Patient Findings    Left voicemail message to report a therapeutic INR of 2.26.  Pt to continue with current warfarin dosing regimen. Requested pt contact the clinic for any s/s of unusual bleeding, bruising, clotting or any changes to diet or medication. FU 4 weeks.  Marcio Diaz, PharmD

## 2018-04-30 ENCOUNTER — TELEPHONE (OUTPATIENT)
Dept: MEDICAL GROUP | Facility: PHYSICIAN GROUP | Age: 63
End: 2018-04-30

## 2018-04-30 DIAGNOSIS — Z12.11 SCREENING FOR COLON CANCER: ICD-10-CM

## 2018-04-30 NOTE — TELEPHONE ENCOUNTER
Good morning Dr. Wesley,    Pt sent a message through [facility] requesting a referral for a Colonoscopy. Please advise.    Thank you.

## 2018-05-05 DIAGNOSIS — Z95.2 H/O AORTIC VALVE REPLACEMENT: ICD-10-CM

## 2018-05-07 ENCOUNTER — NON-PROVIDER VISIT (OUTPATIENT)
Dept: MEDICAL GROUP | Facility: PHYSICIAN GROUP | Age: 63
End: 2018-05-07
Payer: COMMERCIAL

## 2018-05-07 DIAGNOSIS — Z23 NEED FOR VACCINATION: ICD-10-CM

## 2018-05-07 PROCEDURE — 90471 IMMUNIZATION ADMIN: CPT | Performed by: NURSE PRACTITIONER

## 2018-05-07 PROCEDURE — 90715 TDAP VACCINE 7 YRS/> IM: CPT | Performed by: NURSE PRACTITIONER

## 2018-05-07 RX ORDER — WARFARIN SODIUM 5 MG/1
TABLET ORAL
Qty: 135 TAB | Refills: 1 | Status: SHIPPED | OUTPATIENT
Start: 2018-05-07

## 2018-05-07 NOTE — PROGRESS NOTES
"Félix Elam is a 62 y.o. male here for a non-provider visit for:   TDAP    Reason for immunization: Overdue/Provider Recommended  Immunization records indicate need for vaccine: Yes, confirmed with [place] [employer]  Minimum interval has been met for this vaccine: Yes  ABN completed: Not Indicated    Order and dose verified by: Jason GRULLON   VIS Dated  2/24/15 was given to patient: Yes  All IAC Questionnaire questions were answered \"No.\"    Patient tolerated injection and no adverse effects were observed or reported: Yes    Pt scheduled for next dose in series: No    "

## 2018-05-07 NOTE — PROGRESS NOTES
Patient presented here for MA visit for TDAP.  Confirmed this is due in EPIC. Routing to PCP to sign order.

## 2018-06-04 ENCOUNTER — HOSPITAL ENCOUNTER (OUTPATIENT)
Dept: LAB | Facility: MEDICAL CENTER | Age: 63
End: 2018-06-04
Attending: NURSE PRACTITIONER
Payer: COMMERCIAL

## 2018-06-04 ENCOUNTER — OFFICE VISIT (OUTPATIENT)
Dept: MEDICAL GROUP | Facility: PHYSICIAN GROUP | Age: 63
End: 2018-06-04
Payer: COMMERCIAL

## 2018-06-04 VITALS
WEIGHT: 234.2 LBS | HEART RATE: 68 BPM | SYSTOLIC BLOOD PRESSURE: 120 MMHG | RESPIRATION RATE: 16 BRPM | TEMPERATURE: 98.2 F | BODY MASS INDEX: 31.72 KG/M2 | DIASTOLIC BLOOD PRESSURE: 78 MMHG | HEIGHT: 72 IN | OXYGEN SATURATION: 93 %

## 2018-06-04 DIAGNOSIS — Z95.2 H/O AORTIC VALVE REPLACEMENT: ICD-10-CM

## 2018-06-04 DIAGNOSIS — Z79.01 CHRONIC ANTICOAGULATION: ICD-10-CM

## 2018-06-04 DIAGNOSIS — I10 ESSENTIAL HYPERTENSION: ICD-10-CM

## 2018-06-04 DIAGNOSIS — E66.9 OBESITY (BMI 30-39.9): ICD-10-CM

## 2018-06-04 DIAGNOSIS — E78.00 PURE HYPERCHOLESTEROLEMIA: ICD-10-CM

## 2018-06-04 DIAGNOSIS — Z00.00 ENCOUNTER FOR PREVENTATIVE ADULT HEALTH CARE EXAMINATION: ICD-10-CM

## 2018-06-04 DIAGNOSIS — R20.2 TINGLING IN EXTREMITIES: ICD-10-CM

## 2018-06-04 LAB
INR PPP: 2.74 (ref 0.87–1.13)
PROTHROMBIN TIME: 28.7 SEC (ref 12–14.6)

## 2018-06-04 PROCEDURE — 85610 PROTHROMBIN TIME: CPT

## 2018-06-04 PROCEDURE — 99396 PREV VISIT EST AGE 40-64: CPT | Performed by: FAMILY MEDICINE

## 2018-06-04 PROCEDURE — 36415 COLL VENOUS BLD VENIPUNCTURE: CPT

## 2018-06-04 ASSESSMENT — PATIENT HEALTH QUESTIONNAIRE - PHQ9: CLINICAL INTERPRETATION OF PHQ2 SCORE: 0

## 2018-06-04 ASSESSMENT — PAIN SCALES - GENERAL: PAINLEVEL: NO PAIN

## 2018-06-04 NOTE — LETTER
[facility]  Kusum Wesley M.D.  [address]  [place] [address]  Fax: [phone]   Authorization for Release/Disclosure of   Protected Health Information   Name: FÉLIX LOVELACE : 1955 SSN: [id number]   Address: [address] [place] [address]  [place] [address] Phone:    [phone] (home)    I authorize the entity listed below to release/disclose the PHI below to:   [facility]/Kusum Wesley M.D. and Kusum Wesley M.D.   Provider or Entity Name:  [facility]   Address   City, State, [facility]   Phone:      Fax:     Reason for request: continuity of care   Information to be released:    [ x ] LAST COLONOSCOPY,  including any PATH REPORT and follow-up  [  ] LAST FIT/COLOGUARD RESULT [  ] LAST DEXA  [  ] LAST MAMMOGRAM  [  ] LAST PAP  [  ] LAST LABS [  ] RETINA EXAM REPORT  [  ] IMMUNIZATION RECORDS  [  ] Release all info      [  ] Check here and initial the line next to each item to release ALL health information INCLUDING  _____ Care and treatment for drug and / or alcohol abuse  _____ HIV testing, infection status, or AIDS  _____ Genetic Testing    DATES OF SERVICE OR TIME PERIOD TO BE DISCLOSED: _____________  I understand and acknowledge that:  * This Authorization may be revoked at any time by you in writing, except if your health information has already been used or disclosed.  * Your health information that will be used or disclosed as a result of you signing this authorization could be re-disclosed by the recipient. If this occurs, your re-disclosed health information may no longer be protected by State or Federal laws.  * You may refuse to sign this Authorization. Your refusal will not affect your ability to obtain treatment.  * This Authorization becomes effective upon signing and will  on (date) __________.      If no date is indicated, this Authorization will  one (1) year from the signature date.    Name: Félix Lovelace    Signature:   Date:     2018       PLEASE FAX  REQUESTED RECORDS BACK TO: [phone]

## 2018-06-05 ENCOUNTER — ANTICOAGULATION MONITORING (OUTPATIENT)
Dept: VASCULAR LAB | Facility: MEDICAL CENTER | Age: 63
End: 2018-06-05

## 2018-06-05 DIAGNOSIS — Z95.2 H/O AORTIC VALVE REPLACEMENT: ICD-10-CM

## 2018-06-05 DIAGNOSIS — Z79.01 CHRONIC ANTICOAGULATION: ICD-10-CM

## 2018-06-05 NOTE — PROGRESS NOTES
Subjective:     CC:   Chief Complaint   Patient presents with   • Annual Exam       HPI:   Félix Elam is a 62 y.o. male who presents for an annual exam. He is feeling well and has no complaints. He will be moving back to [place] with his family later this month.    Last colonoscopy: 2016  Last Tdap: 2018   Hx STDs: No  Recent STD test: N/A  Regular exercise: Yes (walking)  Diet: Healthy, some fast food when traveling.    He  has a past medical history of Gout; Hyperlipidemia; and Hypertension.  He  has a past surgical history that includes aortic valve replacement.     Family History   Problem Relation Age of Onset   • Heart Disease Father    • Heart Disease Brother    • Cancer Brother      Melanoma   • Other Mother      Gout   • Cancer Mother      Melanoma and SCC   • Other Brother      Parkinson's Disease   • No Known Problems Daughter    • Heart Disease Son      Pulmonary hypertension     Social History   Substance Use Topics   • Smoking status: Never Smoker   • Smokeless tobacco: Never Used   • Alcohol use 1.2 oz/week     2 Glasses of wine per week       Patient Active Problem List    Diagnosis Date Noted   • Abnormal WBC count 12/18/2017   • Elevated fasting glucose 12/18/2017   • Tingling in extremities 06/07/2017   • Essential hypertension 06/07/2017   • Pure hypercholesterolemia 06/07/2017   • Obesity (BMI 30-39.9) 06/07/2017   • Family history of melanoma 06/07/2017   • Thrombocytopenia (HCC) 08/10/2016   • Chronic gout 11/09/2015   • Chronic anticoagulation 11/09/2015   • H/O aortic valve replacement 11/09/2015       Current Outpatient Prescriptions   Medication Sig Dispense Refill   • warfarin (COUMADIN) 5 MG Tab TAKE 1 TO 1 1/2 TABLETS BY MOUTH EVERY DAY AS DIRECTED BY COUMADIN CLINIC 135 Tab 1   • allopurinol (ZYLOPRIM) 300 MG Tab Take 1 Tab by mouth every day. 90 Tab 1   • metoprolol SR (TOPROL XL) 100 MG TABLET SR 24 HR TAKE 1 TABLET BY MOUTH EVERY DAY 90 Tab 3   • hydrochlorothiazide  (HYDRODIURIL) 12.5 MG tablet TAKE 1 TABLET BY MOUTH EVERY DAY 90 Tab 2   • amlodipine-benazepril (LOTREL) 5-40 MG per capsule Take 1 Cap by mouth every day. 90 Cap 3   • ASPIRIN LOW DOSE 81 MG EC tablet TAKE 1 TABLET BY MOUTH DAILY 90 Tab 2     No current facility-administered medications for this visit.     (including changes today)  Allergies: Patient has no known allergies.    Review of Systems   Constitutional: Negative for fever, chills and malaise/fatigue.   HENT: Negative for congestion.    Eyes: Negative for pain.   Respiratory: Negative for cough and shortness of breath.    Cardiovascular: Negative for leg swelling.   Gastrointestinal: Negative for nausea, vomiting, abdominal pain and diarrhea.   Genitourinary: Negative for dysuria and hematuria.   Skin: Negative for rash.   Neurological: Negative for dizziness, focal weakness and headaches.   Endo/Heme/Allergies: Does not bruise/bleed easily.   Psychiatric/Behavioral: Negative for depression.  The patient is not nervous/anxious.      Objective:     /78   Pulse 68   Temp 36.8 °C (98.2 °F)   Resp 16   Ht 1.829 m (6')   Wt 106.2 kg (234 lb 3.2 oz)   SpO2 93%   BMI 31.76 kg/m²   Body mass index is 31.76 kg/m².  Wt Readings from Last 4 Encounters:   06/04/18 106.2 kg (234 lb 3.2 oz)   02/12/18 106.6 kg (235 lb)   12/18/17 108 kg (238 lb)   10/24/17 108 kg (238 lb)     Physical Exam:  Constitutional: Well-developed and well-nourished. Not diaphoretic. No distress.   Skin: Skin is warm and dry. No rash noted.  Head: Atraumatic without lesions.  Eyes: Conjunctivae and extraocular motions are normal. Pupils are equal, round, and reactive to light. No scleral icterus.   Ears:  External ears unremarkable. Tympanic membranes clear and intact.  Nose: Nares patent. Septum midline. Turbinates without erythema nor edema. No discharge.   Mouth/Throat: Dentition is good. Tongue normal. Oropharynx is clear and moist. Posterior pharynx without erythema or  exudates.  Neck: Supple, trachea midline. Normal range of motion. No thyromegaly present. No lymphadenopathy--cervical or supraclavicular.  Cardiovascular: Healed surgical scar. Regular rate and rhythm, soft systolic murmur with mechanical click at S2, rubs, or gallops.    Chest: Effort normal. Clear to auscultation throughout. No adventitious sounds. No CVA tenderness.  Abdomen: Soft, non tender, and without distention. Active bowel sounds in all four quadrants. No rebound, guarding, masses or HSM.  : Deferred.  Extremities: No cyanosis, clubbing, erythema, nor edema. Distal pulses intact and symmetric.   Musculoskeletal: All major joints AROM full in all directions without pain.  Neurological: Alert and oriented x 3. DTRs 2+/3 and symmetric. No cranial nerve deficit. 5/5 myotomes. Sensation intact.  Psychiatric:  Behavior, mood, and affect are appropriate.    Assessment and Plan:     1. Encounter for preventative adult health care examination  This is a relatively healthy 62-year-old male here today for a preventative exam. Previous medical history, healthcare maintenance and immunization status reviewed. Patient is up to date. See discussion of anticipatory guidance below. Patient will return annually for preventative exams.   2. H/O aortic valve replacement  Per patient history. He continues on anticoagulation. He has alerted his former cardiologist in [place] that he will be moving back.   3. Chronic anticoagulation  See above.   4. Essential hypertension  Chronic and stable. Continue current medications.   5. Pure hypercholesterolemia  Encouraged patient to have his labwork done. Statin therapy was discontinued several months ago due to adverse effect.   6. Tingling in extremities  Improved with cessation of statin. Labwork pending.   7. Obesity (BMI 30-39.9)  Patient identified as having weight management issue.  Appropriate orders and counseling given.     HCM: Up to date.  Labs per orders.  Vaccinations  per orders.  Counseling about diet, supplements, exercise, skin care and safe sex.    Follow-up: Patient will be moving out of the area later this month. Follow-up as needed.

## 2018-06-05 NOTE — PROGRESS NOTES
OP Telephone Anticoagulation Service Note    Date: 6/5/2018      Anticoagulation Summary  As of 6/5/2018    INR goal:   2.0-3.0   TTR:   78.4 % (2.4 y)   Today's INR:   2.74 (6/4/2018)   Warfarin maintenance plan:   7.5 mg (5 mg x 1.5) on Mon, Wed, Fri; 5 mg (5 mg x 1) all other days   Weekly warfarin total:   42.5 mg   Plan last modified:   Gianni Rodgers, PharmD (9/12/2016)   Next INR check:      Target end date:   Indefinite    Indications    Chronic anticoagulation [Z79.01]  H/O aortic valve replacement [Z95.2]             Anticoagulation Episode Summary     INR check location:       Preferred lab:       Send INR reminders to:       Comments:         Anticoagulation Care Providers     Provider Role Specialty Phone number    Sheila Neville M.D. Referring Cardiology [phone]    Alyx Moura A.P.N. Responsible Cardiology [phone]    ERIKA PattonP.NLili Responsible Cardiology [phone]        Anticoagulation Patient Findings    Left voicemail message to report a therapeutic INR of 2.74. Pt to continue with current warfarin dosing regimen. Requested pt contact the clinic for any s/s of unusual bleeding, bruising, clotting or any changes to diet or medication. FU 4 weeks.      Malcolm Bush, Pharmacy Intern     I have reviewed and agree with the plan above on 06/05/2018     Judy Craig, PharmD

## 2018-06-23 ENCOUNTER — HOSPITAL ENCOUNTER (OUTPATIENT)
Dept: LAB | Facility: MEDICAL CENTER | Age: 63
End: 2018-06-23
Attending: FAMILY MEDICINE
Payer: COMMERCIAL

## 2018-06-23 ENCOUNTER — HOSPITAL ENCOUNTER (OUTPATIENT)
Dept: LAB | Facility: MEDICAL CENTER | Age: 63
End: 2018-06-23
Attending: NURSE PRACTITIONER
Payer: COMMERCIAL

## 2018-06-23 DIAGNOSIS — R73.01 ELEVATED FASTING GLUCOSE: ICD-10-CM

## 2018-06-23 DIAGNOSIS — E78.00 PURE HYPERCHOLESTEROLEMIA: ICD-10-CM

## 2018-06-23 DIAGNOSIS — Z11.59 ENCOUNTER FOR HEPATITIS C SCREENING TEST FOR LOW RISK PATIENT: ICD-10-CM

## 2018-06-23 DIAGNOSIS — D69.6 THROMBOCYTOPENIA (HCC): ICD-10-CM

## 2018-06-23 DIAGNOSIS — D72.9 ABNORMAL WBC COUNT: ICD-10-CM

## 2018-06-23 LAB
ALBUMIN SERPL BCP-MCNC: 4.2 G/DL (ref 3.2–4.9)
ALBUMIN/GLOB SERPL: 1.8 G/DL
ALP SERPL-CCNC: 45 U/L (ref 30–99)
ALT SERPL-CCNC: 30 U/L (ref 2–50)
ANION GAP SERPL CALC-SCNC: 6 MMOL/L (ref 0–11.9)
AST SERPL-CCNC: 32 U/L (ref 12–45)
BASOPHILS # BLD AUTO: 1.2 % (ref 0–1.8)
BASOPHILS # BLD: 0.05 K/UL (ref 0–0.12)
BILIRUB SERPL-MCNC: 0.7 MG/DL (ref 0.1–1.5)
BUN SERPL-MCNC: 16 MG/DL (ref 8–22)
CALCIUM SERPL-MCNC: 9.1 MG/DL (ref 8.5–10.5)
CHLORIDE SERPL-SCNC: 108 MMOL/L (ref 96–112)
CHOLEST SERPL-MCNC: 201 MG/DL (ref 100–199)
CO2 SERPL-SCNC: 28 MMOL/L (ref 20–33)
CREAT SERPL-MCNC: 0.74 MG/DL (ref 0.5–1.4)
EOSINOPHIL # BLD AUTO: 0.13 K/UL (ref 0–0.51)
EOSINOPHIL NFR BLD: 3.1 % (ref 0–6.9)
ERYTHROCYTE [DISTWIDTH] IN BLOOD BY AUTOMATED COUNT: 47.9 FL (ref 35.9–50)
GLOBULIN SER CALC-MCNC: 2.3 G/DL (ref 1.9–3.5)
GLUCOSE SERPL-MCNC: 109 MG/DL (ref 65–99)
HCT VFR BLD AUTO: 43.3 % (ref 42–52)
HCV AB SER QL: NEGATIVE
HDLC SERPL-MCNC: 43 MG/DL
HGB BLD-MCNC: 14.1 G/DL (ref 14–18)
IMM GRANULOCYTES # BLD AUTO: 0.01 K/UL (ref 0–0.11)
IMM GRANULOCYTES NFR BLD AUTO: 0.2 % (ref 0–0.9)
INR PPP: 2.21 (ref 0.87–1.13)
LDLC SERPL CALC-MCNC: 135 MG/DL
LYMPHOCYTES # BLD AUTO: 0.89 K/UL (ref 1–4.8)
LYMPHOCYTES NFR BLD: 21.3 % (ref 22–41)
MCH RBC QN AUTO: 30.8 PG (ref 27–33)
MCHC RBC AUTO-ENTMCNC: 32.6 G/DL (ref 33.7–35.3)
MCV RBC AUTO: 94.5 FL (ref 81.4–97.8)
MONOCYTES # BLD AUTO: 0.28 K/UL (ref 0–0.85)
MONOCYTES NFR BLD AUTO: 6.7 % (ref 0–13.4)
NEUTROPHILS # BLD AUTO: 2.82 K/UL (ref 1.82–7.42)
NEUTROPHILS NFR BLD: 67.5 % (ref 44–72)
NRBC # BLD AUTO: 0 K/UL
NRBC BLD-RTO: 0 /100 WBC
PLATELET # BLD AUTO: 107 K/UL (ref 164–446)
PMV BLD AUTO: 12.2 FL (ref 9–12.9)
POTASSIUM SERPL-SCNC: 3.9 MMOL/L (ref 3.6–5.5)
PROT SERPL-MCNC: 6.5 G/DL (ref 6–8.2)
PROTHROMBIN TIME: 24.2 SEC (ref 12–14.6)
RBC # BLD AUTO: 4.58 M/UL (ref 4.7–6.1)
SODIUM SERPL-SCNC: 142 MMOL/L (ref 135–145)
TRIGL SERPL-MCNC: 114 MG/DL (ref 0–149)
WBC # BLD AUTO: 4.2 K/UL (ref 4.8–10.8)

## 2018-06-23 PROCEDURE — 36415 COLL VENOUS BLD VENIPUNCTURE: CPT

## 2018-06-23 PROCEDURE — 85025 COMPLETE CBC W/AUTO DIFF WBC: CPT

## 2018-06-23 PROCEDURE — 86803 HEPATITIS C AB TEST: CPT

## 2018-06-23 PROCEDURE — 85610 PROTHROMBIN TIME: CPT

## 2018-06-23 PROCEDURE — 80053 COMPREHEN METABOLIC PANEL: CPT

## 2018-06-23 PROCEDURE — 80061 LIPID PANEL: CPT

## 2018-06-25 ENCOUNTER — ANTICOAGULATION MONITORING (OUTPATIENT)
Dept: VASCULAR LAB | Facility: MEDICAL CENTER | Age: 63
End: 2018-06-25

## 2018-06-25 DIAGNOSIS — Z95.2 H/O AORTIC VALVE REPLACEMENT: ICD-10-CM

## 2018-06-25 DIAGNOSIS — Z79.01 CHRONIC ANTICOAGULATION: ICD-10-CM

## 2018-06-25 NOTE — PROGRESS NOTES
OP Anticoagulation Telephone Note    Date: 6/25/2018  Anticoagulation Summary  As of 6/25/2018    INR goal:   2.0-3.0   TTR:   78.9 % (2.4 y)   Today's INR:   2.21 (6/23/2018)   Warfarin maintenance plan:   7.5 mg (5 mg x 1.5) on Mon, Wed, Fri; 5 mg (5 mg x 1) all other days   Weekly warfarin total:   42.5 mg   No change documented:   Velma Friend, Med Ass't   Plan last modified:   Gianni Rodgers, PharmD (9/12/2016)   Next INR check:   7/23/2018   Target end date:   Indefinite    Indications    Chronic anticoagulation [Z79.01]  H/O aortic valve replacement [Z95.2]             Anticoagulation Episode Summary     INR check location:       Preferred lab:       Send INR reminders to:       Comments:         Anticoagulation Care Providers     Provider Role Specialty Phone number    Sheila Neville M.D. Referring Cardiology [phone]    Alyx Moura A.P.N. Responsible Cardiology [phone]    Claire Aranda A.P.NLili Responsible Cardiology [phone]        Anticoagulation Patient Findings  Patient Findings     Negatives:   Signs/symptoms of thrombosis, Signs/symptoms of bleeding, Laboratory test error suspected, Change in health, Change in alcohol use, Change in activity, Upcoming invasive procedure, Emergency department visit, Upcoming dental procedure, Missed doses, Extra doses, Change in medications, Change in diet/appetite, Hospital admission, Bruising, Other complaints      Plan:  Spoke with patient on the phone. Patient is therapeutic today. Patient denies any changes in medications or diet. Patient denies any signs or symptoms of bleeding or clotting. Instructed patient to call clinic if any unusual bleeding or bruising occurs. Will continue dosing as outlined above. Will follow-up with patient in 4 weeks.    Velma Friend, Medical Assistant    I have reviewed and am in agreement with the above stated plan on 6-25-18.  Marcio Diaz, PharmD

## 2018-08-07 ENCOUNTER — TELEPHONE (OUTPATIENT)
Dept: VASCULAR LAB | Facility: MEDICAL CENTER | Age: 63
End: 2018-08-07

## 2018-08-07 NOTE — TELEPHONE ENCOUNTER
OP Anticoagulation Telephone Note    Date: 8/7/2018       Plan:  Called to remind patient to get PT/INR lab done.  Left VM for pt to check INR ASAP.     Pooja Patton, Pharm D

## 2018-08-16 ENCOUNTER — ANTICOAGULATION MONITORING (OUTPATIENT)
Dept: VASCULAR LAB | Facility: MEDICAL CENTER | Age: 63
End: 2018-08-16

## 2018-08-16 DIAGNOSIS — Z95.2 H/O AORTIC VALVE REPLACEMENT: ICD-10-CM

## 2018-08-16 DIAGNOSIS — Z79.01 CHRONIC ANTICOAGULATION: ICD-10-CM

## 2018-08-16 NOTE — PROGRESS NOTES
Discharged from [facility].  Anticoagulation now managed by Dr. Myers at [facility].  Felisa LockhartD

## 2018-10-16 ENCOUNTER — ANTICOAGULATION MONITORING (OUTPATIENT)
Dept: VASCULAR LAB | Facility: MEDICAL CENTER | Age: 63
End: 2018-10-16

## 2021-03-03 DIAGNOSIS — Z23 NEED FOR VACCINATION: ICD-10-CM
